# Patient Record
Sex: FEMALE | Race: WHITE | NOT HISPANIC OR LATINO | Employment: OTHER | URBAN - METROPOLITAN AREA
[De-identification: names, ages, dates, MRNs, and addresses within clinical notes are randomized per-mention and may not be internally consistent; named-entity substitution may affect disease eponyms.]

---

## 2019-07-29 ENCOUNTER — HOSPITAL ENCOUNTER (OUTPATIENT)
Dept: TELEMEDICINE | Facility: HOSPITAL | Age: 49
Discharge: HOME | End: 2019-07-29
Payer: MEDICAID

## 2019-07-29 DIAGNOSIS — I63.81 LACUNAR STROKE: ICD-10-CM

## 2019-07-29 PROCEDURE — 99203 PR OFFICE/OUTPT VISIT, NEW, LEVL III, 30-44 MIN: ICD-10-PCS | Mod: 95,,, | Performed by: PSYCHIATRY & NEUROLOGY

## 2019-07-29 PROCEDURE — 99203 OFFICE O/P NEW LOW 30 MIN: CPT | Mod: 95,,, | Performed by: PSYCHIATRY & NEUROLOGY

## 2019-07-29 NOTE — SUBJECTIVE & OBJECTIVE
Woke up with symptoms?: no    Recent bleeding noted: nosebleed 2 weeks ago  Does the patient take any Blood Thinners? no  Medications: Antiplatelets:  aspirin and clopidogrel/Plavix      Past Medical History: hypertension, diabetes, hyperlipidemia and stroke    Past Surgical History: no major surgeries within the last 2 weeks    Family History: no relevant history    Social History: no smoking, no drinking, no drugs    Allergies: Allergies have not been reviewed No relevant allergies    Review of Systems   Constitutional: Negative for fever.   HENT: Positive for nosebleeds.    Respiratory: Negative for shortness of breath.    Genitourinary: Negative for dysuria.   Musculoskeletal: Positive for back pain.   Neurological: Positive for weakness and numbness.     Objective:   Vitals: There were no vitals taken for this visit. BP: 130/75, HR 61    CT READ: Yes  No hemmorhage. No mass effect. No early infarct signs.     Physical Exam   Constitutional:   Obese, no distress   HENT:   Head: Normocephalic and atraumatic.   Eyes: Pupils are equal, round, and reactive to light. EOM are normal.   Pulmonary/Chest: Effort normal.   Neurological:   MS: A&XO3, speech fluent, follows commands, no neglect  CN: PERRL, EOMI, sensation intact, L facial, mild dysarthria  Motor: L arm/leg drift  Sens: reduced sensation and paresthesias L hemibody  Coord: no ataxia on finger to nose

## 2019-07-29 NOTE — HPI
48 y/o woman with PMH HTN, T2DM, stroke ~5 mos ago (received tPA, mild residual dysarthria), who presents for L sided weakness and numbness.  She states she was taking a shower around 2:30 when she twisted her back.  She felt lower back pain and then numbness and weakness on the L side of her body.  On arrival to ED, /75.

## 2020-03-10 ENCOUNTER — HOSPITAL ENCOUNTER (OUTPATIENT)
Dept: TELEMEDICINE | Facility: HOSPITAL | Age: 50
Discharge: HOME | End: 2020-03-10

## 2022-07-04 ENCOUNTER — HOSPITAL ENCOUNTER (OUTPATIENT)
Dept: TELEMEDICINE | Facility: HOSPITAL | Age: 52
Discharge: HOME | End: 2022-07-04
Payer: MEDICAID

## 2022-07-04 PROCEDURE — 99215 PR OFFICE/OUTPT VISIT, EST, LEVL V, 40-54 MIN: ICD-10-PCS | Mod: 95,,, | Performed by: PSYCHIATRY & NEUROLOGY

## 2022-07-04 PROCEDURE — 99215 OFFICE O/P EST HI 40 MIN: CPT | Mod: 95,,, | Performed by: PSYCHIATRY & NEUROLOGY

## 2022-07-04 NOTE — CONSULTS
[facility]  Vascular Neurology  Comprehensive Stroke Center  TeleVascular Neurology Acute Consultation Note      Consults    Consulting Provider: ANT BLUM  Current Providers  No providers found    Patient Location: [facility] - [facility] Emergency Department  Spoke hospital nurse at bedside with patient assisting consultant.     Patient information was obtained from patient.         Assessment/Plan:   Telestroke from [facility]. LKN: 5:00PM, Symptoms: Patient has a hx of a prior CVA with residual right side deficits. Currently having left sided weakness, left facial droop and slurred speech. She is candidate of tPA as her symptoms consistent with acute ischemic stroke.     IV- TPA: per the NINDS Study, IV TPA given within 3 hour window has a 33% chance of disability  improvement, 2% chance of patient getting worse, and 1% chance of severe complication/death.  Informed consent was obtained discussing this trial and IV TPA therapy. All questions by patient and/or  family were answered and the decision to proceed with IV TPA was made.    Recommend getting CTA head and neck, if large vessel occlusion, will get EVT, If no LVO, below is plan.    - Admit to MICU for 24 hour monitoring s/p IV tPA given at 6:47 PM    - Please use IV TPA order set  - Keep Strict NPO until patient passes bedside SS  - BP: Aggressive Treatment to keep blood pressure less than 180/105 mmHg, using: labetalol or  nicardipine, avoid hydralazine/nitrates  - Emergent CT Brain for: acute neurologic decline, nausea, vomiting, or new headache  - Repeat Imaging, ideally MRI Brain in 24 hours post treatment, but if unable to obtain MRI Brain, please get repeat non-con CT Brain  - TTE with shunt  - LDL, HgA1C  - Neuro checks, vital checks  - PT/OT/SS      Diagnoses:   Stroke like symptoms    STROKE DOCUMENTATION     Acute Stroke Times:   Acute Stroke Times   Last  Known Normal Date: 07/04/22  Last Known Normal Time: 1700  Stroke Team Called Date: 07/04/22  Stroke Team Called Time: 1744  Stroke Team Arrival Date: 07/04/22  Stroke Team Arrival Time: 1754  CT completed but images not available for review - spoke to document results: 1756    NIH Scale:  1a. Level of Consciousness: 0-->Alert, keenly responsive  1b. LOC Questions: 1-->Answers one question correctly  1c. LOC Commands: 0-->Performs both tasks correctly  2. Best Gaze: 0-->Normal  3. Visual: 0-->No visual loss  4. Facial Palsy: 2-->Partial paralysis (total or near-total paralysis of lower face)  5a. Motor Arm, Left: 2-->Some effort against gravity, limb cannot get to or maintain (if cued) 90 (or 45) degrees, drifts down to bed, but has some effort against gravity  5b. Motor Arm, Right: 0-->No drift, limb holds 90 (or 45) degrees for full 10 secs  6a. Motor Leg, Left: 3-->No effort against gravity, leg falls to bed immediately  6b. Motor Leg, Right: 0-->No drift, leg holds 30 degree position for full 5 secs  7. Limb Ataxia: 0-->Absent  8. Sensory: 1-->Mild-to-moderate sensory loss, patient feels pinprick is less sharp or is dull on the affected side, or there is a loss of superficial pain with pinprick, but patient is aware of being touched  9. Best Language: 0-->No aphasia, normal  10. Dysarthria: 1-->Mild-to-moderate dysarthria, patient slurs at least some words and, at worst, can be understood with some difficulty  11. Extinction and Inattention (formerly Neglect): 0-->No abnormality  Total (NIH Stroke Scale): 10     Modified [place]    [place] Coma Scale:    ABCD2 Score:    [id number] Score:   HAS -BLED Score:   ICH Score:   Hunt & Almaraz Classification:       There were no vitals taken for this visit.  Alteplase Eligible?: Yes  Alteplase Recommendation:   Alteplase Total Dose:   Total dose: Alteplase 0.9mg/kg (max dose:90mg)                      ** based on acquired weight from facility   Bolus Dose:   10% of total  Alteplase dose given intravenously over 1 minute   Continuous Infusion Dose:   Remaining 90% of total Alteplase dose infused intravenously over 60 minutes    **infusion must start at the same time as the bolus dose     Additional Recommendations:   1. Neurological assessment and vital signs (except temperature) every 15 minutes during Altaplase infusion.  2. Frequency of BP assessments may need to be increased if systolic BP stays >= 180 mm Hg or diastolic BP stays >= 105 mm Hg. Administer antihypertensive meds as ordered  3. Continue to monitor and control blood pressure and monitor for neurological deterioration every 15 minutes for the first hour after the infusion is stopped. Then every 30 minutes for the next 6 hours. Perform hourly monitoring from the 8th post-infusion hour until 24 hours post-infusion.  4. Temperature every 4 hours or as required.  5. Follow hospital protocol for further orders re: post tPA infusion patient management.  6. No antithrombotics or anticoagulants (including but not limited to: heparin, warfarin, aspirin, clopidigrel, or dipyridamole) for 24 hours, then start antithrombotics as ordered by treating physician    Adapted from the American Heart Association/American Stroke Association (AHA/ASA) and American Association of Neuroscience Nurses (AANN) Guidelines.   Possible Interventional Revascularization Candidate? Awaiting CTA results from Spoke for determination     Disposition Recommendation: pending further studies    Subjective:     History of Present Illness:  Telestroke from [facility]. LKN: 5:00PM, Symptoms: Patient has a hx of a prior CVA with residual right side deficits. Currently having left sided weakness, left facial droop and slurred speech.           Woke up with symptoms?: no    Recent bleeding noted: no  Does the patient take any Blood Thinners? yes  Medications: Antiplatelets:  clopidogrel/Plavix      Past Medical History: hypertension and stroke    Past  Surgical History: no major surgeries within the last 2 weeks    Family History: no relevant history    Social History: unable to obtain    Allergies: Allergies have not been reviewed No relevant allergies    Review of Systems  Objective:   Vitals: There were no vitals taken for this visit. BP: 137/75    CT READ: No    Physical Exam          Recommended the emergency room physician to have a brief discussion with the patient and/or family if available regarding the  risks and benefits of treatment, and to briefly document the occurrence of that discussion in his clinical encounter note.     The attending portion of this evaluation, treatment, and documentation was performed per Delbert Benedict MD via audiovisual.    Billing code:  (moderate to severe stroke, large areas of edema, some mimics)    · This patient has a critical neurological condition/illness, with high morbidity and mortality.  · There is a high probability for acute neurological change leading to clinical and possibly life-threatening deterioration requiring highest level of physician preparedness for urgent intervention.  · Care was coordinated with other physicians involved in the patient's care.  · Radiologic studies and laboratory data were reviewed and interpreted, and plan of care was re-assessed based on the results.  · Diagnosis, treatment options and prognosis may have been discussed with the patient and/or family members or caregiver.  · Further advanced medical management and further evaluation is warranted for his care.      In your opinion, this was a: Tier 1 Van Negative    Consult End Time: 6:16 PM     Delbert Benedict MD  Comprehensive Stroke Center  Vascular Neurology   [facility]

## 2022-07-04 NOTE — SUBJECTIVE & OBJECTIVE
Woke up with symptoms?: no    Recent bleeding noted: no  Does the patient take any Blood Thinners? yes  Medications: Antiplatelets:  clopidogrel/Plavix      Past Medical History: hypertension and stroke    Past Surgical History: no major surgeries within the last 2 weeks    Family History: no relevant history    Social History: unable to obtain    Allergies: Allergies have not been reviewed No relevant allergies    Review of Systems  Objective:   Vitals: There were no vitals taken for this visit. BP: 137/75    CT READ: No    Physical Exam

## 2022-07-04 NOTE — HPI
Telestroke from [facility]. LKN: 5:00PM, Symptoms: Patient has a hx of a prior CVA with residual right side deficits. Currently having left sided weakness, left facial droop and slurred speech.

## 2022-10-13 ENCOUNTER — HOSPITAL ENCOUNTER (OUTPATIENT)
Dept: TELEMEDICINE | Facility: HOSPITAL | Age: 52
Discharge: HOME | End: 2022-10-13
Payer: MEDICAID

## 2022-10-13 DIAGNOSIS — I63.9 ACUTE ISCHEMIC STROKE: ICD-10-CM

## 2022-10-13 PROCEDURE — 99215 PR OFFICE/OUTPT VISIT, EST, LEVL V, 40-54 MIN: ICD-10-PCS | Mod: 95,,, | Performed by: PSYCHIATRY & NEUROLOGY

## 2022-10-13 PROCEDURE — 99215 OFFICE O/P EST HI 40 MIN: CPT | Mod: 95,,, | Performed by: PSYCHIATRY & NEUROLOGY

## 2022-10-13 NOTE — ASSESSMENT & PLAN NOTE
Acute onset of disabling left sided weakness.  No contraindications to tpa.  DDx includes mimic/functional    Antithrombotics for secondary stroke prevention: Antiplatelets: None: Hold all Antithrombotics x 24 hours after IV t-PA administration    Statins for secondary stroke prevention and hyperlipidemia, if present:   Statins: per profile/home    Aggressive risk factor modification: HTN     Rehab efforts: The patient has been evaluated by a stroke team provider and the therapy needs have been fully considered based off the presenting complaints and exam findings. The following therapy evaluations are needed: PT evaluate and treat, OT evaluate and treat, SLP evaluate and treat    Diagnostics ordered/pending: CTA Head to assess vasculature , CTA Neck/Arch to assess vasculature, HgbA1C to assess blood glucose levels, Lipid Profile to assess cholesterol levels, MRI head without contrast to assess brain parenchyma, TTE to assess cardiac function/status     VTE prophylaxis: None: Reason for No Pharmacological VTE Prophylaxis: Holding x 24 hours s/p treatment with alteplase (tPA)    BP parameters: Infarct: Post tPA, SBP <180

## 2022-10-13 NOTE — SUBJECTIVE & OBJECTIVE
Woke up with symptoms?: no    Recent bleeding noted: no  Does the patient take any Blood Thinners? no  Medications: Antiplatelets:  clopidogrel/Plavix      Past Medical History: Stroke HTN. DM, bipolar      Past Surgical History: no major surgeries within the last 2 weeks    Family History: no relevant history    Social History: no smoking, no drinking, no drugs    Allergies: Allergies have not been reviewed No relevant allergies    Review of Systems   Constitutional: Negative for appetite change.   HENT: Negative for congestion.    Eyes: Negative for discharge.   Respiratory: Negative for shortness of breath.    Cardiovascular: Negative for chest pain.   Gastrointestinal: Negative for abdominal pain.   Endocrine: Negative for cold intolerance.   Genitourinary: Negative for difficulty urinating.   Musculoskeletal: Negative for joint swelling.   Skin: Negative for color change.     Objective:   Vitals:    HR 82, o2 92, RR 14, /85 mmHg  CT READ: Yes  No hemmorhage. No mass effect. No early infarct signs.     Physical Exam  Constitutional:       General: She is not in acute distress.  HENT:      Head: Normocephalic.      Right Ear: External ear normal.      Left Ear: External ear normal.   Eyes:      Conjunctiva/sclera: Conjunctivae normal.   Pulmonary:      Effort: Pulmonary effort is normal.      Breath sounds: No stridor.   Abdominal:      Tenderness: There is no abdominal tenderness.   Musculoskeletal:      Cervical back: Normal range of motion.   Skin:     General: Skin is dry.      Findings: No rash.

## 2022-10-13 NOTE — CONSULTS
[facility]  Vascular Neurology  Comprehensive Stroke Center  TeleVascular Neurology Acute Consultation Note      Consults    Consulting Provider: NELY MCKEON  Current Providers  No providers found    Patient Location: [facility] Emergency Department  Spoke hospital nurse at bedside with patient assisting consultant.     Patient information was obtained from patient.         Assessment/Plan:       Diagnoses:   Acute ischemic stroke  Acute onset of disabling left sided weakness.  No contraindications to tpa.  DDx includes mimic/functional    Antithrombotics for secondary stroke prevention: Antiplatelets: None: Hold all Antithrombotics x 24 hours after IV t-PA administration    Statins for secondary stroke prevention and hyperlipidemia, if present:   Statins: per profile/home    Aggressive risk factor modification: HTN     Rehab efforts: The patient has been evaluated by a stroke team provider and the therapy needs have been fully considered based off the presenting complaints and exam findings. The following therapy evaluations are needed: PT evaluate and treat, OT evaluate and treat, SLP evaluate and treat    Diagnostics ordered/pending: CTA Head to assess vasculature , CTA Neck/Arch to assess vasculature, HgbA1C to assess blood glucose levels, Lipid Profile to assess cholesterol levels, MRI head without contrast to assess brain parenchyma, TTE to assess cardiac function/status     VTE prophylaxis: None: Reason for No Pharmacological VTE Prophylaxis: Holding x 24 hours s/p treatment with alteplase (tPA)    BP parameters: Infarct: Post tPA, SBP <180          STROKE DOCUMENTATION     Acute Stroke Times:   Acute Stroke Times   Last Known Normal Time: 1600  Stroke Team Called Time: 1628  Stroke Team Arrival Time: 1641  CT Interpretation Time: 1641  Alteplase Recommended: YES. 1648  Thrombectomy Recommended: No    NIH Scale:  1a. Level  of Consciousness: 0-->Alert, keenly responsive  1b. LOC Questions: 0-->Answers both questions correctly  1c. LOC Commands: 0-->Performs both tasks correctly  2. Best Gaze: 0-->Normal  3. Visual: 0-->No visual loss  4. Facial Palsy: 2-->Partial paralysis (total or near-total paralysis of lower face)  5a. Motor Arm, Left: 2-->Some effort against gravity, limb cannot get to or maintain (if cued) 90 (or 45) degrees, drifts down to bed, but has some effort against gravity  5b. Motor Arm, Right: 0-->No drift, limb holds 90 (or 45) degrees for full 10 secs  6a. Motor Leg, Left: 2-->Some effort against gravity, leg falls to bed by 5 secs, but has some effort against gravity  6b. Motor Leg, Right: 0-->No drift, leg holds 30 degree position for full 5 secs  7. Limb Ataxia: 0-->Absent  8. Sensory: 1-->Mild-to-moderate sensory loss, patient feels pinprick is less sharp or is dull on the affected side, or there is a loss of superficial pain with pinprick, but patient is aware of being touched  9. Best Language: 0-->No aphasia, normal  10. Dysarthria: 1-->Mild-to-moderate dysarthria, patient slurs at least some words and, at worst, can be understood with some difficulty  11. Extinction and Inattention (formerly Neglect): 0-->No abnormality  Total (NIH Stroke Scale): 8     Modified [place]    [place] Coma Scale:    ABCD2 Score:    [id number] Score:   HAS -BLED Score:   ICH Score:   Hunt & Almaraz Classification:       There were no vitals taken for this visit.  Alteplase Eligible?: Yes  Alteplase Recommendation:   Alteplase Total Dose:   Total dose: Alteplase 0.9mg/kg (max dose:90mg)                      ** based on acquired weight from facility   Bolus Dose:   10% of total Alteplase dose given intravenously over 1 minute   Continuous Infusion Dose:   Remaining 90% of total Alteplase dose infused intravenously over 60 minutes    **infusion must start at the same time as the bolus dose     Additional Recommendations:   Neurological  assessment and vital signs (except temperature) every 15 minutes during Altaplase infusion.  Frequency of BP assessments may need to be increased if systolic BP stays >= 180 mm Hg or diastolic BP stays >= 105 mm Hg. Administer antihypertensive meds as ordered  Continue to monitor and control blood pressure and monitor for neurological deterioration every 15 minutes for the first hour after the infusion is stopped. Then every 30 minutes for the next 6 hours. Perform hourly monitoring from the 8th post-infusion hour until 24 hours post-infusion.  Temperature every 4 hours or as required.  Follow hospital protocol for further orders re: post tPA infusion patient management.  No antithrombotics or anticoagulants (including but not limited to: heparin, warfarin, aspirin, clopidigrel, or dipyridamole) for 24 hours, then start antithrombotics as ordered by treating physician    Adapted from the American Heart Association/American Stroke Association (AHA/ASA) and American Association of Neuroscience Nurses (AANN) Guidelines.   Possible Interventional Revascularization Candidate? No; at this time symptoms not suggestive of large vessel occlusion    Disposition Recommendation: admit to inpatient    Subjective:     History of Present Illness:  52F w/ LSN 1600, has slurred speech and numbness on left side      Woke up with symptoms?: no    Recent bleeding noted: no  Does the patient take any Blood Thinners? no  Medications: Antiplatelets:  clopidogrel/Plavix      Past Medical History: Stroke HTN. DM, bipolar      Past Surgical History: no major surgeries within the last 2 weeks    Family History: no relevant history    Social History: no smoking, no drinking, no drugs    Allergies: Allergies have not been reviewed No relevant allergies    Review of Systems   Constitutional: Negative for appetite change.   HENT: Negative for congestion.    Eyes: Negative for discharge.   Respiratory: Negative for shortness of breath.     Cardiovascular: Negative for chest pain.   Gastrointestinal: Negative for abdominal pain.   Endocrine: Negative for cold intolerance.   Genitourinary: Negative for difficulty urinating.   Musculoskeletal: Negative for joint swelling.   Skin: Negative for color change.     Objective:   Vitals:    HR 82, o2 92, RR 14, /85 mmHg  CT READ: Yes  No hemmorhage. No mass effect. No early infarct signs.     Physical Exam  Constitutional:       General: She is not in acute distress.  HENT:      Head: Normocephalic.      Right Ear: External ear normal.      Left Ear: External ear normal.   Eyes:      Conjunctiva/sclera: Conjunctivae normal.   Pulmonary:      Effort: Pulmonary effort is normal.      Breath sounds: No stridor.   Abdominal:      Tenderness: There is no abdominal tenderness.   Musculoskeletal:      Cervical back: Normal range of motion.   Skin:     General: Skin is dry.      Findings: No rash.           Recommended the emergency room physician to have a brief discussion with the patient and/or family if available regarding the  risks and benefits of treatment, and to briefly document the occurrence of that discussion in his clinical encounter note.     The attending portion of this evaluation, treatment, and documentation was performed per Jesse Ibrahim MD via audiovisual.    Billing code:  (time dependent stroke, complex case, unstable patient, hemorrhages, any intervention, some mimics)    This patient has a very critical neurological condition/illness, with very high morbidity and mortality.  There is a very high probability for acute neurological change leading to clinical and possibly life-threatening deterioration requiring highest level of physician preparedness for urgent intervention.  There is possibility that this condition will require treatment with high risk medications as quickly as possible.  There is also a possibility that the patient may benefit from further, more advance complex  therapies (e.g. endovascular therapy) that will require prompt diagnosis and care.  Care was coordinated with other physicians involved in the patient's care.  Radiologic studies and laboratory data were reviewed and interpreted, and plan of care was re-assessed based on the results.  Diagnosis, treatment options and prognosis may have been discussed with the patient and/or family members or caregiver.  Further advanced medical management and further evaluation is warranted for his care.    In your opinion, this was a: Tier 1 Van Negative    Consult End Time: 4:49 PM     Jesse Ibrahim MD  [facility] Stroke [facility]  Vascular Neurology   [facility]

## 2022-11-15 ENCOUNTER — HOSPITAL ENCOUNTER (INPATIENT)
Facility: HOSPITAL | Age: 52
LOS: 3 days | Discharge: HOME HEALTH | DRG: 065 | End: 2022-11-18
Attending: EMERGENCY MEDICINE | Admitting: INTERNAL MEDICINE
Payer: MEDICAID

## 2022-11-15 DIAGNOSIS — I63.9 STROKE: ICD-10-CM

## 2022-11-15 DIAGNOSIS — R53.1 LEFT-SIDED WEAKNESS: ICD-10-CM

## 2022-11-15 DIAGNOSIS — R29.90 STROKE-LIKE SYMPTOMS: Primary | ICD-10-CM

## 2022-11-15 DIAGNOSIS — I63.9 ACUTE ISCHEMIC STROKE: ICD-10-CM

## 2022-11-15 DIAGNOSIS — R07.9 CHEST PAIN: ICD-10-CM

## 2022-11-15 DIAGNOSIS — M79.602 LEFT ARM PAIN: ICD-10-CM

## 2022-11-15 DIAGNOSIS — Z72.0 TOBACCO USER: ICD-10-CM

## 2022-11-15 DIAGNOSIS — I63.81 LACUNAR STROKE: ICD-10-CM

## 2022-11-15 LAB
ALBUMIN SERPL-MCNC: 3.8 GM/DL (ref 3.5–5)
ALBUMIN/GLOB SERPL: 1 RATIO (ref 1.1–2)
ALP SERPL-CCNC: 127 UNIT/L (ref 40–150)
ALT SERPL-CCNC: 45 UNIT/L (ref 0–55)
AST SERPL-CCNC: 46 UNIT/L (ref 5–34)
BASOPHILS # BLD AUTO: 0.09 X10(3)/MCL (ref 0–0.2)
BASOPHILS NFR BLD AUTO: 0.8 %
BILIRUBIN DIRECT+TOT PNL SERPL-MCNC: 0.4 MG/DL
BNP BLD-MCNC: <10 PG/ML
BUN SERPL-MCNC: 5.5 MG/DL (ref 9.8–20.1)
CALCIUM SERPL-MCNC: 9.4 MG/DL (ref 8.4–10.2)
CHLORIDE SERPL-SCNC: 98 MMOL/L (ref 98–107)
CHOLEST SERPL-MCNC: 131 MG/DL
CHOLEST/HDLC SERPL: 4 {RATIO} (ref 0–5)
CO2 SERPL-SCNC: 29 MMOL/L (ref 22–29)
CREAT SERPL-MCNC: 0.75 MG/DL (ref 0.55–1.02)
CRP SERPL HS-MCNC: 14.23 MG/L
EOSINOPHIL # BLD AUTO: 1.21 X10(3)/MCL (ref 0–0.9)
EOSINOPHIL NFR BLD AUTO: 10.5 %
ERYTHROCYTE [DISTWIDTH] IN BLOOD BY AUTOMATED COUNT: 15 % (ref 11.5–17)
ERYTHROCYTE [SEDIMENTATION RATE] IN BLOOD: 32 MM/HR (ref 0–20)
EST. AVERAGE GLUCOSE BLD GHB EST-MCNC: 271.9 MG/DL
GFR SERPLBLD CREATININE-BSD FMLA CKD-EPI: >60 MLS/MIN/1.73/M2
GLOBULIN SER-MCNC: 3.7 GM/DL (ref 2.4–3.5)
GLUCOSE SERPL-MCNC: 135 MG/DL (ref 74–100)
HBA1C MFR BLD: 11.1 %
HCT VFR BLD AUTO: 45.3 % (ref 37–47)
HDLC SERPL-MCNC: 35 MG/DL (ref 35–60)
HGB BLD-MCNC: 14.4 GM/DL (ref 12–16)
IMM GRANULOCYTES # BLD AUTO: 0.06 X10(3)/MCL (ref 0–0.04)
IMM GRANULOCYTES NFR BLD AUTO: 0.5 %
LDLC SERPL CALC-MCNC: 70 MG/DL (ref 50–140)
LYMPHOCYTES # BLD AUTO: 2.99 X10(3)/MCL (ref 0.6–4.6)
LYMPHOCYTES NFR BLD AUTO: 25.9 %
MCH RBC QN AUTO: 29.1 PG (ref 27–31)
MCHC RBC AUTO-ENTMCNC: 31.8 MG/DL (ref 33–36)
MCV RBC AUTO: 91.5 FL (ref 80–94)
MONOCYTES # BLD AUTO: 0.63 X10(3)/MCL (ref 0.1–1.3)
MONOCYTES NFR BLD AUTO: 5.5 %
NEUTROPHILS # BLD AUTO: 6.6 X10(3)/MCL (ref 2.1–9.2)
NEUTROPHILS NFR BLD AUTO: 56.8 %
NRBC BLD AUTO-RTO: 0 %
PLATELET # BLD AUTO: 339 X10(3)/MCL (ref 130–400)
PMV BLD AUTO: 9.8 FL (ref 7.4–10.4)
POTASSIUM SERPL-SCNC: 4.1 MMOL/L (ref 3.5–5.1)
PROT SERPL-MCNC: 7.5 GM/DL (ref 6.4–8.3)
RBC # BLD AUTO: 4.95 X10(6)/MCL (ref 4.2–5.4)
SODIUM SERPL-SCNC: 136 MMOL/L (ref 136–145)
TRIGL SERPL-MCNC: 129 MG/DL (ref 37–140)
TROPONIN I SERPL-MCNC: <0.01 NG/ML (ref 0–0.04)
TROPONIN I SERPL-MCNC: <0.01 NG/ML (ref 0–0.04)
TSH SERPL-ACNC: 6.42 UIU/ML (ref 0.35–4.94)
VLDLC SERPL CALC-MCNC: 26 MG/DL
WBC # SPEC AUTO: 11.6 X10(3)/MCL (ref 4.5–11.5)

## 2022-11-15 PROCEDURE — 25500020 PHARM REV CODE 255: Performed by: INTERNAL MEDICINE

## 2022-11-15 PROCEDURE — 84484 ASSAY OF TROPONIN QUANT: CPT | Performed by: NURSE PRACTITIONER

## 2022-11-15 PROCEDURE — 96375 TX/PRO/DX INJ NEW DRUG ADDON: CPT

## 2022-11-15 PROCEDURE — 84484 ASSAY OF TROPONIN QUANT: CPT | Performed by: EMERGENCY MEDICINE

## 2022-11-15 PROCEDURE — 93010 ELECTROCARDIOGRAM REPORT: CPT | Mod: ,,, | Performed by: INTERNAL MEDICINE

## 2022-11-15 PROCEDURE — 99285 EMERGENCY DEPT VISIT HI MDM: CPT | Mod: 25

## 2022-11-15 PROCEDURE — 11000001 HC ACUTE MED/SURG PRIVATE ROOM

## 2022-11-15 PROCEDURE — 93005 ELECTROCARDIOGRAM TRACING: CPT

## 2022-11-15 PROCEDURE — 83036 HEMOGLOBIN GLYCOSYLATED A1C: CPT | Performed by: NURSE PRACTITIONER

## 2022-11-15 PROCEDURE — 84443 ASSAY THYROID STIM HORMONE: CPT | Performed by: NURSE PRACTITIONER

## 2022-11-15 PROCEDURE — 86141 C-REACTIVE PROTEIN HS: CPT | Performed by: NURSE PRACTITIONER

## 2022-11-15 PROCEDURE — 85025 COMPLETE CBC W/AUTO DIFF WBC: CPT | Performed by: NURSE PRACTITIONER

## 2022-11-15 PROCEDURE — 93010 EKG 12-LEAD: ICD-10-PCS | Mod: ,,, | Performed by: INTERNAL MEDICINE

## 2022-11-15 PROCEDURE — 82553 CREATINE MB FRACTION: CPT | Performed by: NURSE PRACTITIONER

## 2022-11-15 PROCEDURE — 85651 RBC SED RATE NONAUTOMATED: CPT | Performed by: NURSE PRACTITIONER

## 2022-11-15 PROCEDURE — 80061 LIPID PANEL: CPT | Performed by: NURSE PRACTITIONER

## 2022-11-15 PROCEDURE — 80053 COMPREHEN METABOLIC PANEL: CPT | Performed by: NURSE PRACTITIONER

## 2022-11-15 PROCEDURE — 96374 THER/PROPH/DIAG INJ IV PUSH: CPT

## 2022-11-15 PROCEDURE — 25000003 PHARM REV CODE 250: Performed by: NURSE PRACTITIONER

## 2022-11-15 PROCEDURE — 83735 ASSAY OF MAGNESIUM: CPT | Performed by: NURSE PRACTITIONER

## 2022-11-15 PROCEDURE — 63600175 PHARM REV CODE 636 W HCPCS: Performed by: EMERGENCY MEDICINE

## 2022-11-15 PROCEDURE — 83880 ASSAY OF NATRIURETIC PEPTIDE: CPT | Performed by: NURSE PRACTITIONER

## 2022-11-15 RX ORDER — ASPIRIN 81 MG/1
81 TABLET ORAL DAILY
Status: DISCONTINUED | OUTPATIENT
Start: 2022-11-16 | End: 2022-11-18 | Stop reason: HOSPADM

## 2022-11-15 RX ORDER — SODIUM CHLORIDE 9 MG/ML
INJECTION, SOLUTION INTRAVENOUS CONTINUOUS
Status: DISCONTINUED | OUTPATIENT
Start: 2022-11-15 | End: 2022-11-18 | Stop reason: HOSPADM

## 2022-11-15 RX ORDER — MORPHINE SULFATE 4 MG/ML
4 INJECTION, SOLUTION INTRAMUSCULAR; INTRAVENOUS
Status: COMPLETED | OUTPATIENT
Start: 2022-11-15 | End: 2022-11-15

## 2022-11-15 RX ORDER — ONDANSETRON 2 MG/ML
4 INJECTION INTRAMUSCULAR; INTRAVENOUS
Status: COMPLETED | OUTPATIENT
Start: 2022-11-15 | End: 2022-11-15

## 2022-11-15 RX ORDER — LABETALOL HYDROCHLORIDE 5 MG/ML
10 INJECTION, SOLUTION INTRAVENOUS EVERY 4 HOURS PRN
Status: DISCONTINUED | OUTPATIENT
Start: 2022-11-15 | End: 2022-11-18 | Stop reason: HOSPADM

## 2022-11-15 RX ADMIN — MORPHINE SULFATE 4 MG: 4 INJECTION INTRAVENOUS at 09:11

## 2022-11-15 RX ADMIN — IOPAMIDOL 100 ML: 755 INJECTION, SOLUTION INTRAVENOUS at 10:11

## 2022-11-15 RX ADMIN — SODIUM CHLORIDE: 9 INJECTION, SOLUTION INTRAVENOUS at 11:11

## 2022-11-15 RX ADMIN — ONDANSETRON 4 MG: 2 INJECTION INTRAMUSCULAR; INTRAVENOUS at 09:11

## 2022-11-15 NOTE — FIRST PROVIDER EVALUATION
"Medical screening examination initiated.  I have conducted a focused provider triage encounter, findings are as follows:    Brief history of present illness:  Patient states chest pain and left arm pain. Hx. Of a MI.     Vitals:    11/15/22 1714   BP: 116/82   Pulse: 87   Resp: 18   Temp: 98.3 °F (36.8 °C)   TempSrc: Oral   SpO2: 97%   Weight: 116.6 kg (257 lb)   Height: 5' 5" (1.651 m)       Pertinent physical exam:  Awake, alert    Brief workup plan:  labs, EKG    Preliminary workup initiated; this workup will be continued and followed by the physician or advanced practice provider that is assigned to the patient when roomed.  "

## 2022-11-16 LAB
APPEARANCE UR: CLEAR
APTT PPP: 30.1 SECONDS (ref 23.2–33.7)
BACTERIA #/AREA URNS AUTO: ABNORMAL /HPF
BASOPHILS # BLD AUTO: 0.05 X10(3)/MCL (ref 0–0.2)
BASOPHILS NFR BLD AUTO: 0.5 %
BILIRUB UR QL STRIP.AUTO: NEGATIVE MG/DL
CK MB SERPL-MCNC: 0.8 NG/ML
COLOR UR AUTO: YELLOW
EOSINOPHIL # BLD AUTO: 0.88 X10(3)/MCL (ref 0–0.9)
EOSINOPHIL NFR BLD AUTO: 9.6 %
ERYTHROCYTE [DISTWIDTH] IN BLOOD BY AUTOMATED COUNT: 15.1 % (ref 11.5–17)
GLUCOSE UR QL STRIP.AUTO: NEGATIVE MG/DL
HCT VFR BLD AUTO: 43.5 % (ref 37–47)
HGB BLD-MCNC: 13.6 GM/DL (ref 12–16)
IMM GRANULOCYTES # BLD AUTO: 0.03 X10(3)/MCL (ref 0–0.04)
IMM GRANULOCYTES NFR BLD AUTO: 0.3 %
INR BLD: 0.98 (ref 0–1.3)
KETONES UR QL STRIP.AUTO: NEGATIVE MG/DL
LEUKOCYTE ESTERASE UR QL STRIP.AUTO: ABNORMAL UNIT/L
LYMPHOCYTES # BLD AUTO: 2.6 X10(3)/MCL (ref 0.6–4.6)
LYMPHOCYTES NFR BLD AUTO: 28.3 %
MAGNESIUM SERPL-MCNC: 1.9 MG/DL (ref 1.6–2.6)
MCH RBC QN AUTO: 28.8 PG (ref 27–31)
MCHC RBC AUTO-ENTMCNC: 31.3 MG/DL (ref 33–36)
MCV RBC AUTO: 92 FL (ref 80–94)
MONOCYTES # BLD AUTO: 0.58 X10(3)/MCL (ref 0.1–1.3)
MONOCYTES NFR BLD AUTO: 6.3 %
NEUTROPHILS # BLD AUTO: 5.1 X10(3)/MCL (ref 2.1–9.2)
NEUTROPHILS NFR BLD AUTO: 55 %
NITRITE UR QL STRIP.AUTO: NEGATIVE
NRBC BLD AUTO-RTO: 0 %
PH UR STRIP.AUTO: 5.5 [PH]
PLATELET # BLD AUTO: 309 X10(3)/MCL (ref 130–400)
PMV BLD AUTO: 9.8 FL (ref 7.4–10.4)
POCT GLUCOSE: 100 MG/DL (ref 70–110)
PROT UR QL STRIP.AUTO: NEGATIVE MG/DL
PROTHROMBIN TIME: 12.9 SECONDS (ref 12.5–14.5)
RBC # BLD AUTO: 4.73 X10(6)/MCL (ref 4.2–5.4)
RBC #/AREA URNS AUTO: <5 /HPF
RBC UR QL AUTO: NEGATIVE UNIT/L
SP GR UR STRIP.AUTO: >=1.04 (ref 1–1.03)
SQUAMOUS #/AREA URNS AUTO: <5 /HPF
TROPONIN I SERPL-MCNC: <0.01 NG/ML (ref 0–0.04)
UROBILINOGEN UR STRIP-ACNC: 0.2 MG/DL
WBC # SPEC AUTO: 9.2 X10(3)/MCL (ref 4.5–11.5)
WBC #/AREA URNS AUTO: 75 /HPF

## 2022-11-16 PROCEDURE — 81001 URINALYSIS AUTO W/SCOPE: CPT | Performed by: NURSE PRACTITIONER

## 2022-11-16 PROCEDURE — 87088 URINE BACTERIA CULTURE: CPT | Performed by: NURSE PRACTITIONER

## 2022-11-16 PROCEDURE — 85025 COMPLETE CBC W/AUTO DIFF WBC: CPT | Performed by: NURSE PRACTITIONER

## 2022-11-16 PROCEDURE — 11000001 HC ACUTE MED/SURG PRIVATE ROOM

## 2022-11-16 PROCEDURE — 21400001 HC TELEMETRY ROOM

## 2022-11-16 PROCEDURE — 85610 PROTHROMBIN TIME: CPT | Performed by: NURSE PRACTITIONER

## 2022-11-16 PROCEDURE — 25000003 PHARM REV CODE 250: Performed by: NURSE PRACTITIONER

## 2022-11-16 PROCEDURE — 99223 PR INITIAL HOSPITAL CARE,LEVL III: ICD-10-PCS | Mod: ,,, | Performed by: PSYCHIATRY & NEUROLOGY

## 2022-11-16 PROCEDURE — 84484 ASSAY OF TROPONIN QUANT: CPT | Performed by: NURSE PRACTITIONER

## 2022-11-16 PROCEDURE — 85730 THROMBOPLASTIN TIME PARTIAL: CPT | Performed by: NURSE PRACTITIONER

## 2022-11-16 PROCEDURE — 99223 1ST HOSP IP/OBS HIGH 75: CPT | Mod: ,,, | Performed by: PSYCHIATRY & NEUROLOGY

## 2022-11-16 PROCEDURE — 63600175 PHARM REV CODE 636 W HCPCS: Performed by: NURSE PRACTITIONER

## 2022-11-16 RX ORDER — FUROSEMIDE 20 MG/1
20 TABLET ORAL DAILY
COMMUNITY
Start: 2022-07-04

## 2022-11-16 RX ORDER — QUETIAPINE FUMARATE 100 MG/1
100 TABLET, FILM COATED ORAL DAILY
COMMUNITY
Start: 2022-10-13

## 2022-11-16 RX ORDER — CYCLOBENZAPRINE HCL 5 MG
5 TABLET ORAL 2 TIMES DAILY
Status: ON HOLD | COMMUNITY
Start: 2022-11-10 | End: 2022-11-16

## 2022-11-16 RX ORDER — ATORVASTATIN CALCIUM 80 MG/1
80 TABLET, FILM COATED ORAL DAILY
COMMUNITY
Start: 2022-07-04

## 2022-11-16 RX ORDER — ALPRAZOLAM 1 MG/1
1 TABLET ORAL
COMMUNITY
Start: 2022-07-04

## 2022-11-16 RX ORDER — HYDROCODONE BITARTRATE AND ACETAMINOPHEN 5; 325 MG/1; MG/1
1 TABLET ORAL EVERY 6 HOURS PRN
Status: DISCONTINUED | OUTPATIENT
Start: 2022-11-16 | End: 2022-11-18 | Stop reason: HOSPADM

## 2022-11-16 RX ORDER — ERGOCALCIFEROL 1.25 MG/1
50000 CAPSULE ORAL WEEKLY
COMMUNITY
Start: 2022-10-31

## 2022-11-16 RX ORDER — FAMOTIDINE 40 MG/1
40 TABLET, FILM COATED ORAL NIGHTLY
COMMUNITY
Start: 2022-10-13

## 2022-11-16 RX ORDER — INSULIN ASPART 100 [IU]/ML
1-10 INJECTION, SOLUTION INTRAVENOUS; SUBCUTANEOUS EVERY 6 HOURS PRN
Status: DISCONTINUED | OUTPATIENT
Start: 2022-11-16 | End: 2022-11-18 | Stop reason: HOSPADM

## 2022-11-16 RX ORDER — ALPRAZOLAM 0.25 MG/1
0.25 TABLET ORAL ONCE
Status: COMPLETED | OUTPATIENT
Start: 2022-11-16 | End: 2022-11-16

## 2022-11-16 RX ORDER — CLONIDINE HYDROCHLORIDE 0.1 MG/1
0.1 TABLET ORAL DAILY PRN
COMMUNITY
Start: 2022-07-04

## 2022-11-16 RX ORDER — OMEPRAZOLE 20 MG/1
20 CAPSULE, DELAYED RELEASE ORAL DAILY
COMMUNITY
Start: 2022-05-31

## 2022-11-16 RX ORDER — GLUCAGON 1 MG
1 KIT INJECTION
Status: DISCONTINUED | OUTPATIENT
Start: 2022-11-16 | End: 2022-11-18 | Stop reason: HOSPADM

## 2022-11-16 RX ORDER — QUETIAPINE FUMARATE 300 MG/1
600 TABLET, FILM COATED ORAL NIGHTLY
COMMUNITY
Start: 2022-05-31

## 2022-11-16 RX ORDER — DESVENLAFAXINE SUCCINATE 25 MG/1
50 TABLET, EXTENDED RELEASE ORAL DAILY
COMMUNITY
Start: 2022-07-04

## 2022-11-16 RX ORDER — LISINOPRIL 5 MG/1
5 TABLET ORAL DAILY
COMMUNITY
Start: 2022-07-04

## 2022-11-16 RX ORDER — ACETAMINOPHEN 650 MG/1
650 SUPPOSITORY RECTAL EVERY 6 HOURS PRN
Status: DISCONTINUED | OUTPATIENT
Start: 2022-11-16 | End: 2022-11-16

## 2022-11-16 RX ORDER — MORPHINE SULFATE 4 MG/ML
4 INJECTION, SOLUTION INTRAMUSCULAR; INTRAVENOUS EVERY 4 HOURS PRN
Status: DISCONTINUED | OUTPATIENT
Start: 2022-11-16 | End: 2022-11-18 | Stop reason: HOSPADM

## 2022-11-16 RX ORDER — ACETAMINOPHEN 325 MG/1
650 TABLET ORAL EVERY 6 HOURS PRN
Status: DISCONTINUED | OUTPATIENT
Start: 2022-11-16 | End: 2022-11-18 | Stop reason: HOSPADM

## 2022-11-16 RX ORDER — LEVOTHYROXINE SODIUM 200 UG/1
1 CAPSULE ORAL DAILY
COMMUNITY
Start: 2022-10-19

## 2022-11-16 RX ORDER — METFORMIN HYDROCHLORIDE 1000 MG/1
1000 TABLET ORAL 2 TIMES DAILY
COMMUNITY
Start: 2022-07-04

## 2022-11-16 RX ORDER — ROPINIROLE 1 MG/1
1 TABLET, FILM COATED ORAL NIGHTLY
COMMUNITY
Start: 2022-07-04

## 2022-11-16 RX ORDER — CLOPIDOGREL BISULFATE 75 MG/1
75 TABLET ORAL DAILY
COMMUNITY
Start: 2022-05-31

## 2022-11-16 RX ORDER — ROPINIROLE 1 MG/1
0.5 TABLET, FILM COATED ORAL DAILY
COMMUNITY
Start: 2022-05-31

## 2022-11-16 RX ADMIN — HYDROCODONE BITARTRATE AND ACETAMINOPHEN 1 TABLET: 5; 325 TABLET ORAL at 09:11

## 2022-11-16 RX ADMIN — HYDROCODONE BITARTRATE AND ACETAMINOPHEN 1 TABLET: 5; 325 TABLET ORAL at 03:11

## 2022-11-16 RX ADMIN — MORPHINE SULFATE 4 MG: 4 INJECTION INTRAVENOUS at 12:11

## 2022-11-16 RX ADMIN — ASPIRIN 81 MG: 81 TABLET, COATED ORAL at 09:11

## 2022-11-16 RX ADMIN — MORPHINE SULFATE 4 MG: 4 INJECTION INTRAVENOUS at 06:11

## 2022-11-16 RX ADMIN — ALPRAZOLAM 0.25 MG: 0.25 TABLET ORAL at 09:11

## 2022-11-16 NOTE — H&P
"[facility]  Hospital Medicine History & Physical Examination       Patient Name: Seema Ellis  MRN: [id number]  Patient Class: IP- Inpatient   Admission Date: 11/16/2022   Admitting Service: Hospital Medicine   Length of Stay: 1  Attending Physician: Dr. Walker  Primary Care Provider: AMBIKA Solorzano  Face-to-Face encounter date: 11/16/2022  Code Status: Full  Chief Complaint: Chest Pain (Complaining of left arm pain since yesterday; chest pain today; hx of MI, stroke; received 3mg morphine IV, 1" nitro paste, 4mg zofran IV, 324mg aspirin with ems)    Present at Bedside: Staff  Source of Information: Patient. Medical Records      HISTORY OF PRESENT ILLNESS:   Seema Ellis is a 52 y.o. female with a PMHx of HTN, Hypothyroidism, DM 2, Morbid Obesity - BMI 42.77, Bipolar II Disorder, Anxiety, RLS, HLD, Tobacco Abuse, CVA with residual left hemiplegia who presented to [facility] via EMS on 11/15/2022 with c/o left arm pain that began at 1345 on 11/14/22 with associated CP and left sided facial droop. Left arm pain was described as stabbing pins and needles. She was given Morphine 3 mg IV, Nitro Paste, Zofran 4mg IV, and ASA via EMS. CP had resolved.  Of note, patient was seen an outlying facility with stroke-like symptoms on 10/13/22 and was given tPA with resolution of symptoms.  CUS with left ICA systolic velocities suggest a 50-69% luminal stenosis.    ED vital signs stable.  Labs notable for WBC 11.6, sed rate 32, BUN 5.5, glucose 135, CRP 14.23, TSH 6.4243, hemoglobin A1c 11.1%.  Initial troponin and repeat undetectable.  CXR negative.  CT head negative for acute intracranial findings.  CTA head neck negative for acute intracranial process. She was admitted to hospital medicine services for further work-up and management of care.     REVIEW OF SYSTEMS:   Except as documented, all other systems reviewed and negative     PAST MEDICAL HISTORY:   HTN, Hypothyroidism, IDDM 2, " Morbid Obesity - BMI 42.77, Bipolar II Disorder, Anxiety, RLS, HLD, Tobacco Abuse, CVA with residual left hemiplegia, CAD s/p MI    PAST SURGICAL HISTORY:   Denied    FAMILY HISTORY:   Reviewed and negative    SOCIAL HISTORY:   Denied alcohol, illicit drug use. Smokes 1/2 PPD Cigarettes    ALLERGIES:   Penicillins, Toradol [ketorolac], and Tramadol    HOME MEDICATIONS:     Prior to Admission medications    Not on File     ________________________________________________________________________  INPATIENT LIST OF MEDICATIONS     Current Facility-Administered Medications:     0.9%  NaCl infusion, , Intravenous, Continuous, AMBIKA Stewart, Last Rate: 75 mL/hr at 11/15/22 2315, New Bag at 11/15/22 2315    acetaminophen tablet 650 mg, 650 mg, Oral, Q6H PRN, Raghu Simeon MD    aspirin EC tablet 81 mg, 81 mg, Oral, Daily, AMBIKA Stewart    labetaloL injection 10 mg, 10 mg, Intravenous, Q4H PRN, AMBIKA Stewart  No current outpatient medications on file.    Scheduled Meds:   aspirin  81 mg Oral Daily     Continuous Infusions:   sodium chloride 0.9% 75 mL/hr at 11/15/22 2315     PRN Meds:.acetaminophen, labetalol    PHYSICAL EXAM:     VITAL SIGNS: 24 HRS MIN & MAX LAST   Temp  Min: 98.3 °F (36.8 °C)  Max: 98.3 °F (36.8 °C) 98.3 °F (36.8 °C)   BP  Min: 109/93  Max: 134/84 (!) 117/90     Pulse  Min: 69  Max: 87  73   Resp  Min: 9  Max: 33 12   SpO2  Min: 91 %  Max: 97 % (!) 94 %         General appearance: Well-developed female in no apparent distress.  HENT: Atraumatic head. Moist mucous membranes of oral cavity.  Eyes: Normal extraocular movements.   Neck: Supple.   Lungs: Clear to auscultation bilaterally.   Heart: Regular rate and rhythm. S1 and S2 present. No pedal edema.  Abdomen: Soft, non-distended, non-tender. Obese  Extremities: LUE paresthesias  Skin: No Rash.   Neuro: Motor and sensory exams grossly intact. Dysarthria, left facial droop  Psych/mental status: Appropriate mood and affect. Responds  appropriately to questions.     LABS AND IMAGING:     Recent Labs   Lab 11/15/22  1756 11/16/22  0321   WBC 11.6* 9.2   RBC 4.95 4.73   HGB 14.4 13.6   HCT 45.3 43.5   MCV 91.5 92.0   MCH 29.1 28.8   MCHC 31.8* 31.3*   RDW 15.0 15.1    309   MPV 9.8 9.8       Recent Labs   Lab 11/15/22  1755 11/15/22  2124     --    K 4.1  --    CO2 29  --    BUN 5.5*  --    CREATININE 0.75  --    CALCIUM 9.4  --    MG  --  1.90   ALBUMIN 3.8  --    ALKPHOS 127  --    ALT 45  --    AST 46*  --    BILITOT 0.4  --        Microbiology Results (last 7 days)       ** No results found for the last 168 hours. **             CTA Head and Neck (xpd)  Narrative: Technique:CT angiogram of the intracranial vessels was performed with intravenous contrast with direct axial as well as sagittal and coronal reformations. CT angiogram of the neck vessels was performed with intravenous contrast with direct axial as well as sagittal and coronal reformations.  MIP and MPR images were also reconstructed.    Automated exposure control was utilized to minimize radiation dose.  [facility] 01/09/2019    Comparison:Comparison is with study dated 2022-11-15 21:33:47.    Clinical history:Left facial droop, weakness.    Findings:    Carotid arteries were assessed in accordance with the NASCET criteria.    Intracranial Vascular structures:    Internal carotid arteries:Mild atheromatous calcification of the cavernous clinoid segment of the bilateral internal carotid artery is seen with mild stenosis.    Middle cerebral arteries:Unremarkable.    Anterior cerebral arteries:Unremarkable.    Vertebral arteries:Left vertebral artery is dominant. The vertebrobasilar junction is unremarkable.    Basilar artery:Unremarkable.    Posterior cerebral arteries:Unremarkable.    Posterior communicating arteries:Unremarkable.    Neck Vascular structures:The visualized aorta and origin of the great vessels of the neck appear unremarkable. There is direct origin of the left  vertebral artery from the aortic arch.    Carotids:    Common carotid arteries:Unremarkable.    Internal carotid artery:Left internal carotid artery is unremarkable. Subtle atheromatous calcification without significant stenosis at the origin of the right internal carotid artery is seen.    Vertebral arteries:Left vertebral artery is dominant.    Jugular Veins and venous sinuses:Unremarkable.    Hemorrhage:No acute intracranial hemorrhage is seen.    CSF spaces:The ventricles sulci and basal cisterns are within normal limits.    Brain parenchyma:There is preservation of the grey white junction throughout.    Cerebellum:Unremarkable.    Sella and skull base:The sella appears to be within normal limits.    Intracranial calcifications:Incidental note is made of bilateral choroid plexus calcification. Incidental note is made of some pineal region calcification.  Impression: Impression:    1. No hemodynamically significant stenosis, aneurysm or vascular malformation is seen. No acute intracranial process is seen. Details and findings as noted above.    No significant discrepancy with overnight report.    Electronically signed by: Tomy Santiago  Date:    11/16/2022  Time:    06:25        ASSESSMENT & PLAN:     Suspected Acute CVA  - LUE Paresthesias, Dysarthria, Left Facial Droop  Hx of CVA with residual left sided hemiplegia  Hypothyroidism   Essential HTN  HLD  IDDM 2, uncontrolled, Hgba1c 11.1%  Tobacco Abuse  Morbid Obesity - BMI 42.77  Hx of Bipolar II Disorder, Anxiety, RLS    Plan:  Neurology consulted, follow up with recommendations  MRI brain, B Carotid US and ECHO pending  Hold antihypertensives to allow for permissive hypertension  PRN labetalol as needed for SBP greater than 220 or DBP greater than 100  PT/OT/SLP to evaluate and treat when appropriate  Neuro checks every 4 hours  Fall precautions  PRN Norco for Pain  Xanax PO x1 prior to MRI  Nicotine patch if pt desires  NS at 75 ml/hr  Resume other  appropriate home medications  Labs in AM       VTE Prophylaxis: SCDs    Discharge Planning and Disposition: TBD    ARABELLA, Haley Britton, NP have reviewed and discussed the case with Dr. Walker.   Please see the attending MD's addendum for further assessment and plan.    Haley Britton, AGACNP-BC  11/16/2022    _______________________________________________________________________________  MD Addendum:  IDr. , assumed care of this patient today at --am/pm  For the patient encounter, I performed the substantive portion of the visit, I reviewed the NP/PA documentation, treatment plan, and medical decision making.  I had face to face time with this patient     A. History:    B. Physical exam:    C. Medical decision making:      All diagnosis and differential diagnosis have been reviewed; assessment and plan has been documented; I have personally reviewed the labs and test results that are presently available; I have reviewed the patients medication list; I have reviewed the consulting providers response and recommendations. I have reviewed or attempted to review medical records based upon their availability.    All of the patient and family questions have been addressed and answered. Patient's is agreeable to the above stated plan. I will continue to monitor closely and make adjustments to medical management as needed.      11/16/2022

## 2022-11-16 NOTE — ED PROVIDER NOTES
"Encounter Date: 11/15/2022    SCRIBE #1 NOTE: I, Sanjiv Pascual, am scribing for, and in the presence of,  Anuj Perera MD. I have scribed the following portions of the note - Other sections scribed: HPI, ROS, PE.     History     Chief Complaint   Patient presents with    Chest Pain     Complaining of left arm pain since yesterday; chest pain today; hx of MI, stroke; received 3mg morphine IV, 1" nitro paste, 4mg zofran IV, 324mg aspirin with ems     52 year female with past medical history of stroke presents to ED c/o left arm pain from elbow to wrist, onset 1345 yesterday. Pt reports that she was cleaning her house when pain began. Pt reports that the pain was sharp, stabbing diffusely in LUE. Pt also left sided facial droop onset at about the same time as her arm pain. Pt reports her prior stroke left her with left sided deficits but they resolved. Pt reports additional symptom of chest pain but reports it resolved with nitro, morphine, and ASA administration by EMS. Pt states that recently OG found a blockage in her carotid artery, does not know why these tests were performed. Pt staes that she is on plavix and is compliant. Pt states she smokes. Per chart review there is a note of a tele stroke evaluation with similar symptoms for which she received thrombolytics.  There are no further records of this encounter available.  Patient states she does not remember what she was told during the encounter but states that all of the weakness including the face had resolved until yesterday.  PCP: AMBIKA Solorzano    The history is provided by the patient. No  was used.   Neurologic Problem  Primary symptoms do not include fever, nausea or vomiting. Primary symptoms comment: facial droop, arm pain. The symptoms began yesterday. The symptoms are unchanged. The neurological symptoms are focal. The symptoms occurred on exertion.   Additional symptoms include weakness. Medical issues also " include cerebral vascular accident.   Review of patient's allergies indicates:   Allergen Reactions    Penicillins Itching    Toradol [ketorolac] Itching    Tramadol Itching     No past medical history on file.  No past surgical history on file.  No family history on file.     Review of Systems   Constitutional:  Negative for chills and fever.   HENT:  Negative for sinus pain.    Respiratory:  Negative for cough, chest tightness and shortness of breath.    Cardiovascular:  Positive for chest pain.   Gastrointestinal:  Negative for abdominal pain, diarrhea, nausea and vomiting.   Genitourinary:  Negative for dysuria and hematuria.   Musculoskeletal:         Arm pain   Skin:  Negative for rash.   Neurological:  Positive for facial asymmetry, weakness and numbness. Negative for syncope.   All other systems reviewed and are negative.    Physical Exam     Initial Vitals [11/15/22 1714]   BP Pulse Resp Temp SpO2   116/82 87 18 98.3 °F (36.8 °C) 97 %      MAP       --         Physical Exam    Nursing note and vitals reviewed.  Constitutional: She appears well-developed and well-nourished. She is Obese . No distress.   HENT:   Head: Normocephalic and atraumatic.   Eyes: Conjunctivae are normal.   Cardiovascular:  Normal rate and intact distal pulses.           Pulmonary/Chest: No respiratory distress. She has no rhonchi.   Abdominal: Abdomen is soft. Bowel sounds are normal. There is no abdominal tenderness. There is no rebound and no guarding.   Musculoskeletal:         General: No edema.     Neurological: She is alert.   Left facial droop; 4/5 strength left arm;  4/5 strength left leg; decreased sensation to left face, left arm, and left leg; mild slurred speech   Skin: Skin is warm and dry.   Psychiatric: She has a normal mood and affect.       ED Course   Procedures  Labs Reviewed   COMPREHENSIVE METABOLIC PANEL - Abnormal; Notable for the following components:       Result Value    Glucose Level 135 (*)     Blood Urea  Nitrogen 5.5 (*)     Globulin 3.7 (*)     Albumin/Globulin Ratio 1.0 (*)     Aspartate Aminotransferase 46 (*)     All other components within normal limits   CBC WITH DIFFERENTIAL - Abnormal; Notable for the following components:    WBC 11.6 (*)     MCHC 31.8 (*)     Eos # 1.21 (*)     IG# 0.06 (*)     All other components within normal limits   B-TYPE NATRIURETIC PEPTIDE - Normal   TROPONIN I - Normal   CBC W/ AUTO DIFFERENTIAL    Narrative:     The following orders were created for panel order CBC Auto Differential.  Procedure                               Abnormality         Status                     ---------                               -----------         ------                     CBC with Differential[[id number]]        Abnormal            Final result                 Please view results for these tests on the individual orders.   TROPONIN I        ECG Results              EKG 12-lead (Preliminary result)  Result time 11/15/22 20:46:38      ED Interpretation by Anuj Perera MD (11/15/22 20:46:38, [facility] - Emergency Dept, Emergency Medicine)    EKG at 5:17 p.m..  79 beats per minute.  Incomplete right bundle branch block normal sinus rhythm                                  Imaging Results              CT Head Without Contrast (Final result)  Result time 11/15/22 21:40:13      Final result by Vick Velasquez MD (11/15/22 21:40:13)                   Impression:      No acute intracranial findings.      Electronically signed by: Vick Velasquez  Date:    11/15/2022  Time:    21:40               Narrative:    EXAMINATION:  CT HEAD WITHOUT CONTRAST    CLINICAL HISTORY:  stroke suspected; left sided weakness;    TECHNIQUE:  CT imaging of the head performed from the skull base to the vertex without intravenous contrast. DLP 1018 mGycm. Automatic exposure control, adjustment of mA/kV or iterative reconstruction technique was used to reduce radiation.    COMPARISON:  None  Available.    FINDINGS:  There is no acute cortical infarct, hemorrhage or mass lesion.  The ventricles are normal in size.  There are mild vascular calcifications.    Visualized paranasal sinuses and mastoid air cells are clear.                                       X-Ray Chest PA And Lateral (Final result)  Result time 11/15/22 17:42:47      Final result by John Sorenson MD (11/15/22 17:42:47)                   Impression:      No acute abnormality.      Electronically signed by: John Sorenson  Date:    11/15/2022  Time:    17:42               Narrative:    EXAMINATION:  XR CHEST PA AND LATERAL    CLINICAL HISTORY:  chest pain;    TECHNIQUE:  PA and lateral views of the chest were performed.    COMPARISON:  None    FINDINGS:  The lungs are clear, with normal appearance of pulmonary vasculature and no pleural effusion or pneumothorax.    The cardiac silhouette is normal in size. The hilar and mediastinal contours are unremarkable.    Bones are intact.                                       Medications   morphine injection 4 mg (has no administration in time range)   ondansetron injection 4 mg (has no administration in time range)     Medical Decision Making:   Initial Assessment:   Chest with left arm pain and left chest pain sounds more like patient has weakness in her left face left arm and left leg new at 1:00 p.m. yesterday afternoon.  Over 24 hours ago.  She states the pain is like a stabbing pins and needles pain which sounds consistent with paresthesias.  Does have a history of a CVA and states her previous deficits had completely resolved.  Chest pain has currently resolved.  Differential Diagnosis:   CVA, mi, dysrhythmia, noncompliance with medications, secondary gain all considered  Clinical Tests:   Lab Tests: Ordered and Reviewed  Radiological Study: Ordered and Reviewed  Medical Tests: Reviewed and Ordered  ED Management:  Patient received aspirin prior to arrival will give additional dose  of morphine for pain relief.  She is persistent weakness that she states is new I see that was documented on November 13th but I do not see what her exam was like when she was discharged.  Given these findings she will require admission further stroke evaluation she states she was told she has carotid blockage which could be contributing.  She is outside the window for thrombolytics or neurointerventional.  I have discussed this case with hospitalist service who will admit        Scribe Attestation:   Scribe #1: I performed the above scribed service and the documentation accurately describes the services I performed. I attest to the accuracy of the note.    Attending Attestation:           Physician Attestation for Scribe:  Physician Attestation Statement for Scribe #1: I, Anuj Perera MD, reviewed documentation, as scribed by Sanjiv Pascual in my presence, and it is both accurate and complete.                        Clinical Impression:   Final diagnoses:  [R07.9] Chest pain  [R29.90] Stroke-like symptoms (Primary)  [R53.1] Left-sided weakness  [Z72.0] Tobacco user        ED Disposition Condition    Admit Stable                Anuj Perera MD  11/15/22 2005

## 2022-11-16 NOTE — CONSULTS
Subjective:  She is a 52 y.o. female I am consulted to evaluate for acute on chronic L sided weakness.   Pt states that she has an AIS on septmeber of this year with L sided weakness, but earlier this weak, she feels that the L sided weakness is getting worse, with some chest pain, L jaw pain and pain on her L shoulder.  Say has been diagnosed with Bell's paly before but does not remember what side.           Patient Active Problem List    Diagnosis Date Noted    Acute ischemic stroke 10/13/2022    Lacunar stroke 07/29/2019     No past medical history on file.   No past surgical history on file.   Medications Prior to Admission   Medication Sig Dispense Refill Last Dose    ALPRAZolam (XANAX) 1 MG tablet Take 1 mg by mouth.       atorvastatin (LIPITOR) 80 MG tablet Take 80 mg by mouth once daily.       cloNIDine (CATAPRES) 0.1 MG tablet Take 0.1 mg by mouth daily as needed.       desvenlafaxine succinate (PRISTIQ) 25 mg Tb24 Take 50 mg by mouth once daily.       ergocalciferol (VITAMIN D2) 50,000 unit Cap Take 50,000 Units by mouth once a week.       famotidine (PEPCID) 40 MG tablet Take 40 mg by mouth nightly.       furosemide (LASIX) 20 MG tablet Take 20 mg by mouth once daily.       levothyroxine 200 mcg Cap Take 1 capsule by mouth once daily.       lisinopriL (PRINIVIL,ZESTRIL) 5 MG tablet Take 5 mg by mouth once daily.       metFORMIN (GLUCOPHAGE) 1000 MG tablet Take 1,000 mg by mouth 2 (two) times a day.       metoprolol succinate 100 mg CSpX Take 100 mg by mouth once daily.       QUEtiapine (SEROQUEL) 100 MG Tab Take 100 mg by mouth once daily.       rOPINIRole (REQUIP) 1 MG tablet Take 1 mg by mouth nightly.       semaglutide (OZEMPIC SUBQ)   0 Refill(s), Start Date: 11/10/22 17:08:00 CST       [DISCONTINUED] cyclobenzaprine (FLEXERIL) 5 MG tablet Take 5 mg by mouth 2 (two) times a day.       clopidogreL (PLAVIX) 75 mg tablet Take 75 mg by mouth once daily.       omeprazole (PRILOSEC) 20 MG capsule Take 20 mg  "by mouth once daily.       QUEtiapine (SEROQUEL) 300 MG Tab Take 600 mg by mouth every evening.       rOPINIRole (REQUIP) 1 MG tablet Take 0.5 mg by mouth once daily.        Review of patient's allergies indicates:   Allergen Reactions    Penicillins Itching    Toradol [ketorolac] Itching    Tramadol Itching      Social History     Tobacco Use    Smoking status: Not on file    Smokeless tobacco: Not on file   Substance Use Topics    Alcohol use: Not on file      No family history on file.     Review of Systems  head: NCAT, Skin warm and dry, breathing not labored, no knee or ankle swelling so signs of lymphedema, muscle bulk is normal, mood: good.      Objective:  Vital signs in last 24 hours:  Temp:  [98.9 °F (37.2 °C)] 98.9 °F (37.2 °C)  Pulse:  [69-85] 85  Resp:  [9-33] 18  SpO2:  [91 %-97 %] 95 %  BP: (109-153)/(74-93) 153/85      BP (!) 153/85   Pulse 85   Temp 98.9 °F (37.2 °C)   Resp 18   Ht 5' 5" (1.651 m)   Wt 116.6 kg (257 lb 0.9 oz)   SpO2 95%   BMI 42.78 kg/m²   On exam: pt is awake and alert, conversant, in NAD, mild L face droop, but also has trouble rising her L eye brow, no VF cut, EOMI, no slurred speech, no aphasia.  Minor pronator drift on the L, and minor 4/5 weakness on the LLE slighlty decreased sensation to touch and cold on the L compare to the L.   head: NCAT, Skin warm and dry, breathing not labored, no knee or ankle swelling so signs of lymphedema, muscle bulk is normal, mood: good.    Assessment/Plan:  Acute on chronic L sided weakness on 52 y O F with Hx of chronic IS on September.   Ddx: new AIS, UTI, sz with Real's, cardiac etiology.   MRI is negative for new AIS.   - will get an EEG and if no reason for her weakness, will get C spine as well.  - will re-examin in the AM and see if there is any change to direct us toward a specific diagnosis.   - positive UA   - I think we should resume her home meds, but I will defer to primary team.      "

## 2022-11-16 NOTE — PT/OT/SLP PROGRESS
Occupational Therapy      Patient Name:  Seema Ellis   MRN:  [id number]    OT eval attempted. Pt off the floor for MRI at this time. Will f/u as available.     11/16/2022

## 2022-11-16 NOTE — PT/OT/SLP PROGRESS
Attempted to see pt for speech, language and cognition evaluation; however, pt off the floor for a MRI. Will reattempt at later time schedule permitting.

## 2022-11-16 NOTE — PT/OT/SLP PROGRESS
Physical Therapy      Patient Name:  Seema Ellis   MRN:  [id number]    Pt in MRI. F/u tomorrow if appropriate.

## 2022-11-17 LAB
AV INDEX (PROSTH): 0.63
AV MEAN GRADIENT: 4 MMHG
AV PEAK GRADIENT: 8 MMHG
AV VALVE AREA: 1.99 CM2
AV VELOCITY RATIO: 0.66
BASOPHILS # BLD AUTO: 0.06 X10(3)/MCL (ref 0–0.2)
BASOPHILS NFR BLD AUTO: 0.7 %
BSA FOR ECHO PROCEDURE: 2.31 M2
CV ECHO LV RWT: 1.05 CM
DOP CALC AO PEAK VEL: 1.45 M/S
DOP CALC AO VTI: 28.2 CM
DOP CALC LVOT AREA: 3.1 CM2
DOP CALC LVOT DIAMETER: 2 CM
DOP CALC LVOT PEAK VEL: 0.95 M/S
DOP CALC LVOT STROKE VOLUME: 56.21 CM3
DOP CALC MV VTI: 25.1 CM
DOP CALCLVOT PEAK VEL VTI: 17.9 CM
E WAVE DECELERATION TIME: 177 MSEC
E/A RATIO: 0.86
E/E' RATIO: 9.47 M/S
ECHO LV POSTERIOR WALL: 1.85 CM (ref 0.6–1.1)
EJECTION FRACTION: 65 %
EOSINOPHIL # BLD AUTO: 0.62 X10(3)/MCL (ref 0–0.9)
EOSINOPHIL NFR BLD AUTO: 7 %
ERYTHROCYTE [DISTWIDTH] IN BLOOD BY AUTOMATED COUNT: 14.9 % (ref 11.5–17)
FRACTIONAL SHORTENING: 33 % (ref 28–44)
HCT VFR BLD AUTO: 43 % (ref 37–47)
HGB BLD-MCNC: 13.7 GM/DL (ref 12–16)
IMM GRANULOCYTES # BLD AUTO: 0.02 X10(3)/MCL (ref 0–0.04)
IMM GRANULOCYTES NFR BLD AUTO: 0.2 %
INTERVENTRICULAR SEPTUM: 1.49 CM (ref 0.6–1.1)
LEFT ATRIUM SIZE: 3.5 CM
LEFT ATRIUM VOLUME INDEX MOD: 16.8 ML/M2
LEFT ATRIUM VOLUME MOD: 36.9 CM3
LEFT INTERNAL DIMENSION IN SYSTOLE: 2.36 CM (ref 2.1–4)
LEFT VENTRICLE DIASTOLIC VOLUME INDEX: 23.59 ML/M2
LEFT VENTRICLE DIASTOLIC VOLUME: 51.9 ML
LEFT VENTRICLE MASS INDEX: 106 G/M2
LEFT VENTRICLE SYSTOLIC VOLUME INDEX: 8.8 ML/M2
LEFT VENTRICLE SYSTOLIC VOLUME: 19.3 ML
LEFT VENTRICULAR INTERNAL DIMENSION IN DIASTOLE: 3.53 CM (ref 3.5–6)
LEFT VENTRICULAR MASS: 233.77 G
LV LATERAL E/E' RATIO: 7.89 M/S
LV SEPTAL E/E' RATIO: 11.83 M/S
LVOT MG: 2 MMHG
LVOT MV: 0.62 CM/S
LYMPHOCYTES # BLD AUTO: 2.2 X10(3)/MCL (ref 0.6–4.6)
LYMPHOCYTES NFR BLD AUTO: 24.8 %
MAGNESIUM SERPL-MCNC: 2.2 MG/DL (ref 1.6–2.6)
MCH RBC QN AUTO: 29 PG (ref 27–31)
MCHC RBC AUTO-ENTMCNC: 31.9 MG/DL (ref 33–36)
MCV RBC AUTO: 91.1 FL (ref 80–94)
MONOCYTES # BLD AUTO: 0.54 X10(3)/MCL (ref 0.1–1.3)
MONOCYTES NFR BLD AUTO: 6.1 %
MV MEAN GRADIENT: 2 MMHG
MV PEAK A VEL: 0.83 M/S
MV PEAK E VEL: 0.71 M/S
MV PEAK GRADIENT: 4 MMHG
MV STENOSIS PRESSURE HALF TIME: 69 MS
MV VALVE AREA BY CONTINUITY EQUATION: 2.24 CM2
MV VALVE AREA P 1/2 METHOD: 3.19 CM2
NEUTROPHILS # BLD AUTO: 5.4 X10(3)/MCL (ref 2.1–9.2)
NEUTROPHILS NFR BLD AUTO: 61.2 %
NRBC BLD AUTO-RTO: 0 %
PLATELET # BLD AUTO: 330 X10(3)/MCL (ref 130–400)
PMV BLD AUTO: 9.6 FL (ref 7.4–10.4)
POCT GLUCOSE: 147 MG/DL (ref 70–110)
POCT GLUCOSE: 159 MG/DL (ref 70–110)
POCT GLUCOSE: 166 MG/DL (ref 70–110)
POCT GLUCOSE: 179 MG/DL (ref 70–110)
POTASSIUM SERPL-SCNC: 4.8 MMOL/L (ref 3.5–5.1)
PV PEAK VELOCITY: 1.21 CM/S
RA PRESSURE: 8 MMHG
RBC # BLD AUTO: 4.72 X10(6)/MCL (ref 4.2–5.4)
RIGHT VENTRICULAR END-DIASTOLIC DIMENSION: 2.75 CM
TDI LATERAL: 0.09 M/S
TDI SEPTAL: 0.06 M/S
TDI: 0.08 M/S
TRICUSPID ANNULAR PLANE SYSTOLIC EXCURSION: 2.26 CM
WBC # SPEC AUTO: 8.9 X10(3)/MCL (ref 4.5–11.5)

## 2022-11-17 PROCEDURE — 21400001 HC TELEMETRY ROOM

## 2022-11-17 PROCEDURE — 84132 ASSAY OF SERUM POTASSIUM: CPT | Performed by: NURSE PRACTITIONER

## 2022-11-17 PROCEDURE — 36415 COLL VENOUS BLD VENIPUNCTURE: CPT | Performed by: NURSE PRACTITIONER

## 2022-11-17 PROCEDURE — 99233 PR SUBSEQUENT HOSPITAL CARE,LEVL III: ICD-10-PCS | Mod: 95,,, | Performed by: PSYCHIATRY & NEUROLOGY

## 2022-11-17 PROCEDURE — 25000003 PHARM REV CODE 250: Performed by: INTERNAL MEDICINE

## 2022-11-17 PROCEDURE — 63600175 PHARM REV CODE 636 W HCPCS: Performed by: NURSE PRACTITIONER

## 2022-11-17 PROCEDURE — 97165 OT EVAL LOW COMPLEX 30 MIN: CPT

## 2022-11-17 PROCEDURE — 25000003 PHARM REV CODE 250: Performed by: NURSE PRACTITIONER

## 2022-11-17 PROCEDURE — 63600175 PHARM REV CODE 636 W HCPCS: Performed by: INTERNAL MEDICINE

## 2022-11-17 PROCEDURE — 85025 COMPLETE CBC W/AUTO DIFF WBC: CPT | Performed by: NURSE PRACTITIONER

## 2022-11-17 PROCEDURE — 97161 PT EVAL LOW COMPLEX 20 MIN: CPT

## 2022-11-17 PROCEDURE — 99233 SBSQ HOSP IP/OBS HIGH 50: CPT | Mod: 95,,, | Performed by: PSYCHIATRY & NEUROLOGY

## 2022-11-17 PROCEDURE — 83735 ASSAY OF MAGNESIUM: CPT | Performed by: NURSE PRACTITIONER

## 2022-11-17 RX ORDER — ATORVASTATIN CALCIUM 40 MG/1
80 TABLET, FILM COATED ORAL DAILY
Status: DISCONTINUED | OUTPATIENT
Start: 2022-11-17 | End: 2022-11-18 | Stop reason: HOSPADM

## 2022-11-17 RX ORDER — ALPRAZOLAM 0.5 MG/1
1 TABLET ORAL 3 TIMES DAILY PRN
Status: DISCONTINUED | OUTPATIENT
Start: 2022-11-17 | End: 2022-11-18 | Stop reason: HOSPADM

## 2022-11-17 RX ORDER — CLOPIDOGREL BISULFATE 75 MG/1
75 TABLET ORAL DAILY
Status: DISCONTINUED | OUTPATIENT
Start: 2022-11-17 | End: 2022-11-18 | Stop reason: HOSPADM

## 2022-11-17 RX ORDER — METFORMIN HYDROCHLORIDE 500 MG/1
1000 TABLET ORAL 2 TIMES DAILY
Status: DISCONTINUED | OUTPATIENT
Start: 2022-11-17 | End: 2022-11-18 | Stop reason: HOSPADM

## 2022-11-17 RX ORDER — FUROSEMIDE 20 MG/1
20 TABLET ORAL DAILY
Status: DISCONTINUED | OUTPATIENT
Start: 2022-11-17 | End: 2022-11-18 | Stop reason: HOSPADM

## 2022-11-17 RX ORDER — DESVENLAFAXINE SUCCINATE 50 MG/1
50 TABLET, EXTENDED RELEASE ORAL DAILY
Status: DISCONTINUED | OUTPATIENT
Start: 2022-11-17 | End: 2022-11-18 | Stop reason: HOSPADM

## 2022-11-17 RX ORDER — QUETIAPINE FUMARATE 100 MG/1
100 TABLET, FILM COATED ORAL DAILY
Status: DISCONTINUED | OUTPATIENT
Start: 2022-11-17 | End: 2022-11-18 | Stop reason: HOSPADM

## 2022-11-17 RX ORDER — ROPINIROLE 1 MG/1
1 TABLET, FILM COATED ORAL NIGHTLY
Status: DISCONTINUED | OUTPATIENT
Start: 2022-11-17 | End: 2022-11-18 | Stop reason: HOSPADM

## 2022-11-17 RX ORDER — QUETIAPINE FUMARATE 100 MG/1
600 TABLET, FILM COATED ORAL NIGHTLY
Status: DISCONTINUED | OUTPATIENT
Start: 2022-11-17 | End: 2022-11-18 | Stop reason: HOSPADM

## 2022-11-17 RX ORDER — INSULIN GLARGINE 100 [IU]/ML
35 INJECTION, SOLUTION SUBCUTANEOUS NIGHTLY
COMMUNITY

## 2022-11-17 RX ORDER — ENOXAPARIN SODIUM 100 MG/ML
40 INJECTION SUBCUTANEOUS EVERY 24 HOURS
Status: DISCONTINUED | OUTPATIENT
Start: 2022-11-17 | End: 2022-11-18 | Stop reason: HOSPADM

## 2022-11-17 RX ORDER — LISINOPRIL 5 MG/1
5 TABLET ORAL DAILY
Status: DISCONTINUED | OUTPATIENT
Start: 2022-11-17 | End: 2022-11-18 | Stop reason: HOSPADM

## 2022-11-17 RX ORDER — FAMOTIDINE 20 MG/1
40 TABLET, FILM COATED ORAL NIGHTLY
Status: DISCONTINUED | OUTPATIENT
Start: 2022-11-17 | End: 2022-11-18 | Stop reason: HOSPADM

## 2022-11-17 RX ORDER — PANTOPRAZOLE SODIUM 40 MG/1
40 TABLET, DELAYED RELEASE ORAL DAILY
Status: DISCONTINUED | OUTPATIENT
Start: 2022-11-17 | End: 2022-11-18 | Stop reason: HOSPADM

## 2022-11-17 RX ORDER — LEVOTHYROXINE SODIUM 200 UG/1
200 TABLET ORAL DAILY
Status: DISCONTINUED | OUTPATIENT
Start: 2022-11-17 | End: 2022-11-18 | Stop reason: HOSPADM

## 2022-11-17 RX ORDER — METOPROLOL SUCCINATE 50 MG/1
100 TABLET, EXTENDED RELEASE ORAL DAILY
Status: DISCONTINUED | OUTPATIENT
Start: 2022-11-17 | End: 2022-11-18 | Stop reason: HOSPADM

## 2022-11-17 RX ORDER — ROPINIROLE 0.25 MG/1
0.5 TABLET, FILM COATED ORAL DAILY
Status: DISCONTINUED | OUTPATIENT
Start: 2022-11-17 | End: 2022-11-18 | Stop reason: HOSPADM

## 2022-11-17 RX ORDER — INSULIN GLARGINE 100 [IU]/ML
35 INJECTION, SOLUTION SUBCUTANEOUS DAILY
COMMUNITY

## 2022-11-17 RX ADMIN — ENOXAPARIN SODIUM 40 MG: 40 INJECTION SUBCUTANEOUS at 04:11

## 2022-11-17 RX ADMIN — ASPIRIN 81 MG: 81 TABLET, COATED ORAL at 08:11

## 2022-11-17 RX ADMIN — HYDROCODONE BITARTRATE AND ACETAMINOPHEN 1 TABLET: 5; 325 TABLET ORAL at 08:11

## 2022-11-17 RX ADMIN — FUROSEMIDE 20 MG: 20 TABLET ORAL at 11:11

## 2022-11-17 RX ADMIN — HYDROCODONE BITARTRATE AND ACETAMINOPHEN 1 TABLET: 5; 325 TABLET ORAL at 01:11

## 2022-11-17 RX ADMIN — METFORMIN HYDROCHLORIDE 1000 MG: 500 TABLET, FILM COATED ORAL at 08:11

## 2022-11-17 RX ADMIN — CLOPIDOGREL BISULFATE 75 MG: 75 TABLET ORAL at 11:11

## 2022-11-17 RX ADMIN — LISINOPRIL 5 MG: 5 TABLET ORAL at 11:11

## 2022-11-17 RX ADMIN — QUETIAPINE FUMARATE 100 MG: 100 TABLET ORAL at 11:11

## 2022-11-17 RX ADMIN — METFORMIN HYDROCHLORIDE 1000 MG: 500 TABLET, FILM COATED ORAL at 11:11

## 2022-11-17 RX ADMIN — FAMOTIDINE 40 MG: 20 TABLET, FILM COATED ORAL at 08:11

## 2022-11-17 RX ADMIN — METOPROLOL SUCCINATE 100 MG: 50 TABLET, EXTENDED RELEASE ORAL at 11:11

## 2022-11-17 RX ADMIN — INSULIN ASPART 2 UNITS: 100 INJECTION, SOLUTION INTRAVENOUS; SUBCUTANEOUS at 01:11

## 2022-11-17 RX ADMIN — ROPINIROLE HYDROCHLORIDE 0.5 MG: 0.25 TABLET, FILM COATED ORAL at 11:11

## 2022-11-17 RX ADMIN — ATORVASTATIN CALCIUM 80 MG: 40 TABLET, FILM COATED ORAL at 11:11

## 2022-11-17 RX ADMIN — LEVOTHYROXINE SODIUM 200 MCG: 0.2 TABLET ORAL at 11:11

## 2022-11-17 RX ADMIN — QUETIAPINE FUMARATE 600 MG: 100 TABLET ORAL at 09:11

## 2022-11-17 RX ADMIN — MORPHINE SULFATE 4 MG: 4 INJECTION INTRAVENOUS at 11:11

## 2022-11-17 RX ADMIN — SODIUM CHLORIDE: 9 INJECTION, SOLUTION INTRAVENOUS at 10:11

## 2022-11-17 RX ADMIN — PANTOPRAZOLE SODIUM 40 MG: 40 TABLET, DELAYED RELEASE ORAL at 11:11

## 2022-11-17 RX ADMIN — ROPINIROLE HYDROCHLORIDE 1 MG: 1 TABLET, FILM COATED ORAL at 08:11

## 2022-11-17 RX ADMIN — DESVENLAFAXINE 50 MG: 50 TABLET, EXTENDED RELEASE ORAL at 11:11

## 2022-11-17 NOTE — PT/OT/SLP EVAL
Occupational Therapy   Evaluation and Discharge Note    Name: Seema Ellis  MRN: [id number]  Admitting Diagnosis:  <principal problem not specified>   Recent Surgery: * No surgery found *      Recommendations:     Discharge Recommendations:  home   Discharge Equipment Recommendations:   none   Barriers to discharge:       Assessment:     Seema Ellis is a 52 y.o. female with a medical diagnosis of acute on chronic L side weakness. MRI negative. At this time, patient is functioning at their prior level of function and does not require further acute OT services.     Plan:     During this hospitalization, patient does not require further acute OT services.  Please re-consult if situation changes.    Plan of Care Reviewed with: patient    Subjective     Chief Complaint: --  Patient/Family Comments/goals: --    Occupational Profile:  Living Environment: friends   Previous level of function: indep   Roles and Routines: does not drive or work; on disability   Equipment Used at home:     Assistance upon Discharge: --    Pain/Comfort:       Patients cultural, spiritual, Anabaptism conflicts given the current situation:      Objective:     Communicated with: nrsg prior to session.  Patient found HOB elevated with   upon OT entry to room.    General Precautions: Standard,     Orthopedic Precautions:    Braces:    Respiratory Status: Nasal cannula, flow 1 L/min     Occupational Performance:    Bed Mobility:    Patient completed Supine to Sit with stand by assistance    Functional Mobility/Transfers:  Patient completed Toilet Transfer Step Transfer technique with independence with  no AD  Functional Mobility: no LOB    Activities of Daily Living:  Grooming: independence .  Lower Body Dressing: independence .  Toileting: independence .    Cognitive/Visual Perceptual:  Cognitive/Psychosocial Skills:     -       Oriented to: Person, Place, and Time   Visual/Perceptual:      -pt reports blurry vision in L eye;  tracking and  visual fields WNL     Physical Exam:  Sensation:    -       Impaired  pt reports pain in L arm  Upper Extremity Strength:    -       Right Upper Extremity: WNL  -       Left Upper Extremity: -3 shoulder; 4/5 elbow and digits   Fine Motor Coordination:    -       Intact    AMPAC 6 Click ADL:  AMPAC Total Score:      Treatment & Education:  Educated pt on exercises for LUE. Pt in agreement.     Patient left up in chair with all lines intact and call button in reach    GOALS:   Multidisciplinary Problems       Occupational Therapy Goals          Problem: Occupational Therapy    Goal Priority Disciplines Outcome Interventions   Occupational Therapy Goal     OT, PT/OT                         History:     No past medical history on file.    No past surgical history on file.    Time Tracking:     OT Date of Treatment:    OT Start Time: 0924  OT Stop Time: 0942  OT Total Time (min): 18 min    Billable Minutes:Evaluation Low Complexity     11/17/2022

## 2022-11-17 NOTE — PLAN OF CARE
11/17/22 1120   Discharge Assessment   Assessment Type Discharge Planning Assessment   Confirmed/corrected address, phone number and insurance Yes   Confirmed Demographics Correct on Facesheet   Source of Information patient   Lives With friend(s)   Do you expect to return to your current living situation? Yes   Do you have help at home or someone to help you manage your care at home? Yes   Prior to hospitilization cognitive status: Alert/Oriented   Current cognitive status: Alert/Oriented   Walking or Climbing Stairs Difficulty none   Dressing/Bathing Difficulty none   Home Accessibility stairs within home   Number of Stairs, Within Home, Primary nine   Home Layout Able to live on 1st floor   Equipment Currently Used at Home glucometer   Readmission within 30 days? No   Patient currently being followed by outpatient case management? No   Do you currently have service(s) that help you manage your care at home? Yes   Name and Contact number of agency Current with [facility]   Is the pt/caregiver preference to resume services with current agency Yes   Do you take prescription medications? Yes   Do you have prescription coverage? Yes   Do you have any problems affording any of your prescribed medications? No   Is the patient taking medications as prescribed? yes   How do you get to doctors appointments? car, drives self;family or friend will provide   Are you on dialysis? No   Do you take coumadin? No   Discharge Plan A Home Health   Discharge Plan B Home with family   DME Needed Upon Discharge  none   Discharge Plan discussed with: Patient   Discharge Barriers Identified None   Patient lives with blind family friend and friend's 16 year old grandson that is home schooled. Home is a mobile home with 9 steps without railing upon entrance. DME in Home: Glucometer. Patient is current with [facility]  and would like to return. PCP: Dr. Gardner, Pharmacy: [facility]

## 2022-11-17 NOTE — PROGRESS NOTES
[facility] Hospital Medicine Progress Note        Chief Complaint: Inpatient follow-up on acute on chronic left hemiparesis    HPI:   Seema Ellis is a 52-year-old white female with a past medical history of  essential hypertension, hypothyroidism, non-insulin-dependent type 2 diabetes mellitus, morbid obesity - BMI 42.77, Bipolar II Disorder, restless legs syndrome, hyperlipidemia, tobacco abuse, ischemic stroke with residual left hemiplegia who presented to [facility] via EMS on 11/15/2022 with complaints of left arm pain that began at 1345 on 11/14/22 with associated chest pain and left sided facial droop. Left arm pain was described as stabbing pins and needles. She was given morphine 3 mg IV, Nitro Paste, Zofran 4mg IV, and aspirin via EMS.  Chest pain had resolved.  Of note, patient was seen an outlying facility with stroke-like symptoms on 10/13/22 and was given tPA with resolution of symptoms.  Bilateral carotid Doppler ultrasound with left internal carotid artery systolic velocities suggest a 50-69% luminal stenosis.     ED vital signs stable.  Labs notable for white blood cell count 11,600, erythrocyte sedimentation rate 32, BUN 5.5, glucose 135, C-reactive protein 14.23, thyroid stimulating hormone 6.4243, hemoglobin A1c 11.1%.  Initial troponin and repeat were undetectable.  Chest x-ray is negative. CT head is negative for acute intracranial findings.  CT angiogram of the head and neck negative for acute intracranial process. She was admitted to the hospital medicine service.      Interval Hx:   Patient is sitting up in a chair without any new complaints.  Nursing staff was in the room administering the patient's home medications.  Nursing staff just contacted me with reports of nonsustained ventricular tachycardia seen on telemetry.  Patient is asymptomatic.  Patient is afebrile, on room air, and hemodynamically stable.    Objective/physical exam:  General:  Morbidly obese  white female who appears older than her stated age in no acute distress  HENT: normocephalic, atraumatic  Eye: PERRL, EOMI, clear conjunctiva  Neck: full ROM, no thyromegaly, no JVD  Respiratory:  Diminished breath sounds bilaterally  Cardiovascular: regular rate and rhythm  Gastrointestinal: non-distended, positive bowel sounds, non-tender  Musculoskeletal: no gross deformity  Integumentary: warm, dry, intact, no rashes  Neurological: cranial nerves grossly intact, pre-existing left facial droop and left hemiparesis  Psychiatric: cooperative, flat affect, anxious and depressed    VITAL SIGNS: 24 HRS MIN & MAX LAST   Temp  Min: 97.9 °F (36.6 °C)  Max: 98.9 °F (37.2 °C) 97.9 °F (36.6 °C)   BP  Min: 147/84  Max: 177/88 (!) 158/92     Pulse  Min: 82  Max: 106  83   Resp  Min: 16  Max: 20 18   SpO2  Min: 91 %  Max: 95 % (!) 94 %         Recent Labs   Lab 11/15/22  1756 11/16/22  0321 11/17/22  0730   WBC 11.6* 9.2 8.9   RBC 4.95 4.73 4.72   HGB 14.4 13.6 13.7   HCT 45.3 43.5 43.0   MCV 91.5 92.0 91.1   MCH 29.1 28.8 29.0   MCHC 31.8* 31.3* 31.9*   RDW 15.0 15.1 14.9    309 330   MPV 9.8 9.8 9.6       Recent Labs   Lab 11/15/22  1755 11/15/22  2124     --    K 4.1  --    CO2 29  --    BUN 5.5*  --    CREATININE 0.75  --    CALCIUM 9.4  --    MG  --  1.90   ALBUMIN 3.8  --    ALKPHOS 127  --    ALT 45  --    AST 46*  --    BILITOT 0.4  --           Microbiology Results (last 7 days)       Procedure Component Value Units Date/Time    Urine culture [[id number]] Collected: 11/16/22 0923    Order Status: Completed Specimen: Urine Updated: 11/17/22 0809     Urine Culture No Growth At 24 Hours             See below for Radiology    Scheduled Med:   aspirin  81 mg Oral Daily    atorvastatin  80 mg Oral Daily    clopidogreL  75 mg Oral Daily    desvenlafaxine succinate  50 mg Oral Daily    famotidine  40 mg Oral Nightly    furosemide  20 mg Oral Daily    levothyroxine  200 mcg Oral Daily    lisinopriL  5 mg Oral Daily     metFORMIN  1,000 mg Oral BID    metoprolol succinate  100 mg Oral Daily    pantoprazole  40 mg Oral Daily    QUEtiapine  100 mg Oral Daily    QUEtiapine  600 mg Oral QHS    rOPINIRole  0.5 mg Oral Daily    rOPINIRole  1 mg Oral Nightly        Continuous Infusions:   sodium chloride 0.9% 75 mL/hr at 11/17/22 1045        PRN Meds:  acetaminophen, ALPRAZolam, glucagon (human recombinant), HYDROcodone-acetaminophen, insulin aspart U-100, labetalol, morphine       Assessment/Plan:  Acute on chronic left hemiparesis  History of old stroke with residual left hemiparesis  Tobacco abuse   Bipolar disorder   Essential hypertension   Type 2 insulin-dependent diabetes mellitus, poorly controlled   Morbid obesity   Hyperlipidemia  Hypothyroidism  Nonsustained ventricular tachycardia        Plan:   Request a Cardiology evaluation  Continue continuous cardiac monitoring  Follow-up Neurology evaluation and recommendations.  Patient is slated for an electroencephalogram.  Resumed appropriate home medications       VTE prophylaxis:  Lovenox    Patient condition:  Stable    Anticipated discharge and Disposition:     TBD    All diagnosis and differential diagnosis have been reviewed; assessment and plan has been documented; I have personally reviewed the labs and test results that are presently available; I have reviewed the patients medication list; I have reviewed the consulting providers response and recommendations. I have reviewed or attempted to review medical records based upon their availability    All of the patient's questions have been  addressed and answered. Patient's is agreeable to the above stated plan. I will continue to monitor closely and make adjustments to medical management as needed.  _____________________________________________________________________      Radiology:  Echo Saline Bubble? Yes  · Suboptimal bubble study; appears negative. History of PFO closure.  · Concentric hypertrophy and normal systolic  function.  · The estimated ejection fraction is 65%.  · Grade I left ventricular diastolic dysfunction.  · Normal right ventricular size with normal right ventricular systolic   function.  · Intermediate central venous pressure (8 mmHg).         Gerardo Walker MD   11/17/2022

## 2022-11-17 NOTE — PT/OT/SLP PROGRESS
Attempted to see pt for speech, language, and cognition evaluation; however, pt working with OT. SLP to reattempt at later time schedule permitting.

## 2022-11-17 NOTE — PT/OT/SLP EVAL
Physical Therapy Evaluation    Patient Name:  Seema Ellis   MRN:  [id number]    Recommendations:     Discharge Recommendations:  home health PT   Discharge Equipment Recommendations: walker, rolling   Barriers to discharge: None    Assessment:     Seema Ellis is a 52 y.o. female admitted with a medical diagnosis of acute on chronic L sided weakness. Ischemic infarct in September with residual LI sided weakness that increased.  She presents with the following impairments/functional limitations:  weakness, impaired endurance, impaired functional mobility, gait instability, impaired balance. Pt presents with mild weakness in L LE contributing to gait instability and increased endurance. Pt would benefit from use of RW to improve stability and independence. PT to follow for skilled interventions addressing deficits in order to allow safe discharge to home.    Rehab Prognosis: Good; patient would benefit from acute skilled PT services to address these deficits and reach maximum level of function.    Recent Surgery: * No surgery found *      Plan:     During this hospitalization, patient to be seen 6 x/week to address the identified rehab impairments via gait training, therapeutic activities, therapeutic exercises, neuromuscular re-education and progress toward the following goals:    Plan of Care Expires:  12/17/22    Subjective     Chief Complaint: none  Patient/Family Comments/goals: return home  Pain/Comfort:  Pain Rating 1: 0/10    Patients cultural, spiritual, Confucianism conflicts given the current situation:      Living Environment:  Pt lives in mobile home with 9 steps to enter. NO railings at this time but reports they are working to install. She did not use any AD and states HH PT was coming 2x's per week.  Upon discharge, patient will have assistance from family.    Objective:     Communicated with nurse prior to session.  Patient found up in chair with peripheral IV, oxygen  upon PT entry to  room.    General Precautions: Standard,     Orthopedic Precautions:    Braces: N/A  Respiratory Status: Nasal cannula, flow 1 L/min    Exams:  Cognitive Exam:  Patient is oriented to Person, Place, Time, and Situation  RLE Strength: WNL  LLE Strength: grossly 4-/5    Functional Mobility:  Transfers:     Sit to Stand:  contact guard assistance with rolling walker  Gait: 100ft with RW, CGA. Step through gait pattern with decreased foot clearance on L and mild circumduction for LE advancement  Balance: Unsupported standing with wide BRENDAN and R lateral trunk lean      Patient left up in chair with all lines intact and call button in reach.    GOALS:   Multidisciplinary Problems       Physical Therapy Goals          Problem: Physical Therapy    Goal Priority Disciplines Outcome Goal Variances Interventions   Physical Therapy Goal     PT, PT/OT Ongoing, Progressing     Description: Goals to be met by: 2022     Patient will increase functional independence with mobility by performin. Sit to stand transfer with Modified [place]  2. Bed to chair transfer with Modified [place] using Rolling Walker  3. Gait  x 500 feet with Modified [place] using Rolling Walker.   4. Ascend/Descend 9 steps without railing, SBA                         History:     No past medical history on file.    No past surgical history on file.    Time Tracking:     PT Received On:    PT Start Time: 1320     PT Stop Time: 1345  PT Total Time (min): 25 min     Billable Minutes: Evaluation (low)      2022

## 2022-11-17 NOTE — PLAN OF CARE
Problem: Physical Therapy  Goal: Physical Therapy Goal  Description: Goals to be met by: 2022     Patient will increase functional independence with mobility by performin. Sit to stand transfer with Modified [place]  2. Bed to chair transfer with Modified [place] using Rolling Walker  3. Gait  x 500 feet with Modified [place] using Rolling Walker.   4. Ascend/Descend 9 steps without railing, SBA    Outcome: Ongoing, Progressing

## 2022-11-17 NOTE — PLAN OF CARE
Problem: Physical Therapy  Goal: Physical Therapy Goal  Description: Goals to be met by: 2022     Patient will increase functional independence with mobility by performin. Sit to stand transfer with Modified [place]  2. Bed to chair transfer with Modified [place] using Rolling Walker  3. Gait  x 500 feet with Modified [place] using Rolling Walker.     Outcome: Ongoing, Progressing

## 2022-11-18 VITALS
HEART RATE: 73 BPM | OXYGEN SATURATION: 94 % | HEIGHT: 65 IN | BODY MASS INDEX: 42.83 KG/M2 | WEIGHT: 257.06 LBS | SYSTOLIC BLOOD PRESSURE: 136 MMHG | RESPIRATION RATE: 18 BRPM | TEMPERATURE: 98 F | DIASTOLIC BLOOD PRESSURE: 84 MMHG

## 2022-11-18 PROBLEM — M79.602 LEFT ARM PAIN: Status: ACTIVE | Noted: 2022-11-18

## 2022-11-18 LAB
BACTERIA UR CULT: NORMAL
LEFT CCA DIST DIAS: 28 CM/S
LEFT CCA DIST SYS: 81 CM/S
LEFT CCA PROX DIAS: 23 CM/S
LEFT CCA PROX SYS: 78 CM/S
LEFT ECA DIAS: 21 CM/S
LEFT ECA SYS: 237 CM/S
LEFT ICA DIST DIAS: 47 CM/S
LEFT ICA DIST SYS: 112 CM/S
LEFT ICA MID DIAS: 41 CM/S
LEFT ICA MID SYS: 104 CM/S
LEFT ICA PROX DIAS: 33 CM/S
LEFT ICA PROX SYS: 90 CM/S
LEFT VERTEBRAL DIAS: 16 CM/S
LEFT VERTEBRAL SYS: 60 CM/S
OHS CV CAROTID RIGHT ICA EDV HIGHEST: 34
OHS CV CAROTID ULTRASOUND LEFT ICA/CCA RATIO: 1.38
OHS CV CAROTID ULTRASOUND RIGHT ICA/CCA RATIO: 1.15
OHS CV PV CAROTID LEFT HIGHEST CCA: 81
OHS CV PV CAROTID LEFT HIGHEST ICA: 112
OHS CV PV CAROTID RIGHT HIGHEST CCA: 125
OHS CV PV CAROTID RIGHT HIGHEST ICA: 102
OHS CV US CAROTID LEFT HIGHEST EDV: 47
POCT GLUCOSE: 128 MG/DL (ref 70–110)
POCT GLUCOSE: 150 MG/DL (ref 70–110)
POCT GLUCOSE: 152 MG/DL (ref 70–110)
POCT GLUCOSE: 159 MG/DL (ref 70–110)
POCT GLUCOSE: 206 MG/DL (ref 70–110)
RIGHT CCA DIST DIAS: 20 CM/S
RIGHT CCA DIST SYS: 89 CM/S
RIGHT CCA PROX DIAS: 0 CM/S
RIGHT CCA PROX SYS: 125 CM/S
RIGHT ECA SYS: 79 CM/S
RIGHT ICA DIST DIAS: 34 CM/S
RIGHT ICA DIST SYS: 102 CM/S
RIGHT ICA MID DIAS: 13 CM/S
RIGHT ICA MID SYS: 71 CM/S
RIGHT ICA PROX DIAS: 19 CM/S
RIGHT ICA PROX SYS: 63 CM/S
RIGHT VERTEBRAL DIAS: 16 CM/S
RIGHT VERTEBRAL SYS: 62 CM/S

## 2022-11-18 PROCEDURE — 25000003 PHARM REV CODE 250: Performed by: INTERNAL MEDICINE

## 2022-11-18 PROCEDURE — 97530 THERAPEUTIC ACTIVITIES: CPT

## 2022-11-18 PROCEDURE — 63600175 PHARM REV CODE 636 W HCPCS: Performed by: NURSE PRACTITIONER

## 2022-11-18 PROCEDURE — 25000003 PHARM REV CODE 250: Performed by: NURSE PRACTITIONER

## 2022-11-18 PROCEDURE — 97116 GAIT TRAINING THERAPY: CPT

## 2022-11-18 RX ORDER — ASPIRIN 81 MG/1
81 TABLET ORAL DAILY
Qty: 100 TABLET | Refills: 0 | Status: SHIPPED | OUTPATIENT
Start: 2022-11-19

## 2022-11-18 RX ORDER — HYDROCODONE BITARTRATE AND ACETAMINOPHEN 5; 325 MG/1; MG/1
1 TABLET ORAL EVERY 6 HOURS PRN
Qty: 28 TABLET | Refills: 0 | Status: SHIPPED | OUTPATIENT
Start: 2022-11-18 | End: 2022-11-25

## 2022-11-18 RX ADMIN — ATORVASTATIN CALCIUM 80 MG: 40 TABLET, FILM COATED ORAL at 09:11

## 2022-11-18 RX ADMIN — CLOPIDOGREL BISULFATE 75 MG: 75 TABLET ORAL at 09:11

## 2022-11-18 RX ADMIN — LEVOTHYROXINE SODIUM 200 MCG: 0.2 TABLET ORAL at 09:11

## 2022-11-18 RX ADMIN — METOPROLOL SUCCINATE 100 MG: 50 TABLET, EXTENDED RELEASE ORAL at 09:11

## 2022-11-18 RX ADMIN — SODIUM CHLORIDE: 9 INJECTION, SOLUTION INTRAVENOUS at 12:11

## 2022-11-18 RX ADMIN — LISINOPRIL 5 MG: 5 TABLET ORAL at 09:11

## 2022-11-18 RX ADMIN — ROPINIROLE HYDROCHLORIDE 0.5 MG: 0.25 TABLET, FILM COATED ORAL at 09:11

## 2022-11-18 RX ADMIN — INSULIN ASPART 4 UNITS: 100 INJECTION, SOLUTION INTRAVENOUS; SUBCUTANEOUS at 12:11

## 2022-11-18 RX ADMIN — HYDROCODONE BITARTRATE AND ACETAMINOPHEN 1 TABLET: 5; 325 TABLET ORAL at 12:11

## 2022-11-18 RX ADMIN — PANTOPRAZOLE SODIUM 40 MG: 40 TABLET, DELAYED RELEASE ORAL at 09:11

## 2022-11-18 RX ADMIN — FUROSEMIDE 20 MG: 20 TABLET ORAL at 09:11

## 2022-11-18 RX ADMIN — QUETIAPINE FUMARATE 100 MG: 100 TABLET ORAL at 09:11

## 2022-11-18 RX ADMIN — DESVENLAFAXINE 50 MG: 50 TABLET, EXTENDED RELEASE ORAL at 09:11

## 2022-11-18 RX ADMIN — METFORMIN HYDROCHLORIDE 1000 MG: 500 TABLET, FILM COATED ORAL at 09:11

## 2022-11-18 RX ADMIN — ASPIRIN 81 MG: 81 TABLET, COATED ORAL at 09:11

## 2022-11-18 RX ADMIN — MORPHINE SULFATE 4 MG: 4 INJECTION INTRAVENOUS at 12:11

## 2022-11-18 NOTE — DISCHARGE SUMMARY
[facility]  Hospital Medicine Discharge Summary    Admit Date: 11/15/2022  Discharge Date and Time: 11/18/2022 2:41 PM  Admitting Physician:  Team  Discharging Physician: Gerardo Walker MD.  Primary Care Physician: No primary care provider on file.  Consults: Neurology    Discharge Diagnoses:  Acute on chronic left hemiparesis  Left arm pain  History of old stroke with residual left hemiparesis  Tobacco abuse   Bipolar disorder   Essential hypertension   Type 2 insulin-dependent diabetes mellitus, poorly controlled   Morbid obesity   Hyperlipidemia  Hypothyroidism  Nonsustained ventricular tachycardia    Hospital Course:   Seema Ellis is a 52-year-old white female with a past medical history of  essential hypertension, hypothyroidism, non-insulin-dependent type 2 diabetes mellitus, morbid obesity - BMI 42.77, bipolar disorder, restless legs syndrome, hyperlipidemia, tobacco abuse, ischemic stroke with residual left hemiplegia who presented to [facility] via EMS on 11/15/2022 with complaints of left arm pain that began at 1345 on 11/14/22 with associated chest pain and left sided facial droop. Left arm pain was described as stabbing pins and needles. She was given morphine 3 mg IV, Nitro Paste, Zofran 4mg IV, and aspirin via EMS.  Chest pain had resolved.  Of note, patient was seen an outlying facility with stroke-like symptoms on 10/13/22 and was given tPA with resolution of symptoms.  Bilateral carotid Doppler ultrasound with left internal carotid artery systolic velocities suggest a 50-69% luminal stenosis.     ED vital signs stable.  Labs notable for white blood cell count 11,600, erythrocyte sedimentation rate 32, BUN 5.5, glucose 135, C-reactive protein 14.23, thyroid stimulating hormone 6.4243, hemoglobin A1c 11.1%.  Initial troponin and repeat were undetectable.  Chest x-ray is negative. CT head is negative for acute intracranial findings.  CT angiogram of the head and  neck negative for acute intracranial process.  Patient was very promptly admitted to the hospital medicine service. MRI of the brain was negative for stroke. Neurology was consulted and recommended outpatient follow-up.    Patient was seen and examined on the day of discharge.      Vitals:  VITAL SIGNS: 24 HRS MIN & MAX LAST   Temp  Min: 97.5 °F (36.4 °C)  Max: 98.7 °F (37.1 °C) 98 °F (36.7 °C)   BP  Min: 97/65  Max: 140/79 (!) 140/79     Pulse  Min: 68  Max: 79  76   Resp  Min: 16  Max: 20 18   SpO2  Min: 92 %  Max: 95 % 95 %       Physical Exam:  General:  Morbidly obese white female who appears older than her stated age in no acute distress  HENT: normocephalic, atraumatic  Eye: PERRL, EOMI, clear conjunctiva  Neck: full ROM, no thyromegaly, no JVD  Respiratory:  Diminished breath sounds bilaterally  Cardiovascular: regular rate and rhythm  Gastrointestinal: non-distended, positive bowel sounds, non-tender  Musculoskeletal: no gross deformity  Integumentary: warm, dry, intact, no rashes  Neurological: cranial nerves grossly intact, pre-existing dysarthria, left facial droop, and left hemiparesis  Psychiatric: cooperative, flat affect, anxious and depressed    Procedures Performed: No admission procedures for hospital encounter.     Significant Diagnostic Studies: See Full reports for all details    Recent Labs   Lab 11/15/22  1756 11/16/22  0321 11/17/22  0730   WBC 11.6* 9.2 8.9   RBC 4.95 4.73 4.72   HGB 14.4 13.6 13.7   HCT 45.3 43.5 43.0   MCV 91.5 92.0 91.1   MCH 29.1 28.8 29.0   MCHC 31.8* 31.3* 31.9*   RDW 15.0 15.1 14.9    309 330   MPV 9.8 9.8 9.6       Recent Labs   Lab 11/15/22  1755 11/15/22  2124 11/17/22  1545     --   --    K 4.1  --  4.8   CO2 29  --   --    BUN 5.5*  --   --    CREATININE 0.75  --   --    CALCIUM 9.4  --   --    MG  --  1.90 2.20   ALBUMIN 3.8  --   --    ALKPHOS 127  --   --    ALT 45  --   --    AST 46*  --   --    BILITOT 0.4  --   --         Microbiology Results  (last 7 days)       Procedure Component Value Units Date/Time    Urine culture [[id number]] Collected: 11/16/22 0923    Order Status: Completed Specimen: Urine Updated: 11/18/22 0820     Urine Culture No Significant Growth             X-Ray Humerus 2 View Left  Narrative: EXAMINATION:  XR HUMERUS 2 VIEW LEFT    CLINICAL HISTORY:  arm pain;    COMPARISON:  None.    FINDINGS:  No acute displaced fractures or dislocations.    Joint spaces preserved.    No blastic or lytic lesions.    Soft tissues within normal limits.  Impression: No acute osseous abnormality.    Electronically signed by: Anand Sanchez  Date:    11/18/2022  Time:    09:38         Medication List        START taking these medications      aspirin 81 MG EC tablet  Commonly known as: ECOTRIN  Take 1 tablet (81 mg total) by mouth once daily.  Start taking on: November 19, 2022     HYDROcodone-acetaminophen 5-325 mg per tablet  Commonly known as: NORCO  Take 1 tablet by mouth every 6 (six) hours as needed for Pain.            CONTINUE taking these medications      ALPRAZolam 1 MG tablet  Commonly known as: XANAX     atorvastatin 80 MG tablet  Commonly known as: LIPITOR     cloNIDine 0.1 MG tablet  Commonly known as: CATAPRES     clopidogreL 75 mg tablet  Commonly known as: PLAVIX     desvenlafaxine succinate 25 mg Tb24  Commonly known as: PRISTIQ     furosemide 20 MG tablet  Commonly known as: LASIX     * insulin glargine 100 unit/mL injection  Commonly known as: Lantus     * insulin glargine 100 unit/mL injection  Commonly known as: Lantus     levothyroxine 200 mcg Cap     lisinopriL 5 MG tablet  Commonly known as: PRINIVIL,ZESTRIL     metFORMIN 1000 MG tablet  Commonly known as: GLUCOPHAGE     metoprolol succinate 100 mg Cspx     omeprazole 20 MG capsule  Commonly known as: PRILOSEC     OZEMPIC SUBQ     PEPCID 40 MG tablet  Generic drug: famotidine     * QUEtiapine 300 MG Tab  Commonly known as: SEROQUEL     * QUEtiapine 100 MG Tab  Commonly known as:  SEROQUEL     * rOPINIRole 1 MG tablet  Commonly known as: REQUIP     * rOPINIRole 1 MG tablet  Commonly known as: REQUIP     VITAMIN D2 50,000 unit Cap  Generic drug: ergocalciferol           * This list has 6 medication(s) that are the same as other medications prescribed for you. Read the directions carefully, and ask your doctor or other care provider to review them with you.                   Where to Get Your Medications        These medications were sent to [facility] - [address] [address]      Phone: [phone]   aspirin 81 MG EC tablet  HYDROcodone-acetaminophen 5-325 mg per tablet          Explained in detail to the patient about the discharge plan, medications, and follow-up visits.  Patient understands and agrees with the treatment plan.  Discharge Disposition:  Home with home health  Discharged Condition: stable  Diet-   Dietary Orders (From admission, onward)       Start     Ordered    11/16/22 1642  Diet diabetic  Diet effective now        Comments: Bite size, chopped    11/16/22 1642                     Activity as tolerated   Follow-up Information       Linda Reed MD Follow up in 4 week(s).    Specialty: Neurology  Contact information:  [address] [place] [address] [address]  [facility] [address]  [phone]               [facility] .    Specialty: Home Health Services  Contact information:  [address]  [address]  [facility] [address]  [phone]                           For further questions contact your home health agency    Discharge time 35 minutes    For worsening symptoms, chest pain, shortness of breath, increased abdominal pain, high grade fever, stroke or stroke like symptoms, immediately go to the nearest Emergency Room or call 911 as soon as possible.      Gerardo Edmond M.D, on 11/18/2022. at 2:41 PM.

## 2022-11-18 NOTE — PROGRESS NOTES
"S: NAEO.     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   The R side id strong.   head: NCAT, Skin warm and dry, breathing not labored, no knee or ankle swelling so signs of lymphedema, muscle bulk is normal, mood: good.      A/p:   - Acute on chronic L sided weakness.   - MRI brain is negative for a stroke.   - possible stroke recrudescence.   - may consider MRI C spine w wo as outpatient then f/u with general neurology as outpatient.   - continue current home meds.   - on DAPT and atorva. Agree   F/u with general neurology in 4-8 weeks from the discharge.     "

## 2022-11-18 NOTE — CONSULTS
Inpatient consult to Cardiology  Consult performed by: Raghu Simeon MD  Consult ordered by: Gerardo Walker MD    [facility] - 9th Floor Med Surg  Cardiology  Consult Note    Patient Name: Seema Ellis  MRN: [id number]  Admission Date: 11/15/2022  Hospital Length of Stay: 3 days  Code Status: Full Code   Attending Provider: Raghu Simeon MD   Consulting Provider: Vilma Hilton RN  Primary Care Physician: No primary care provider on file.  Principal Problem:<principal problem not specified>    Patient information was obtained from patient, past medical records, ER records, and primary team.     Subjective:     Reason for consult: NSVT    Chief Complaint:   Left arm pain with associated chest pain and left sided facial droop.    HPI: 51 yo WF unknown to [facility].  PMH essential hypertension, hypothyroid, TypeII DM, Morbid obesity, Bipolar II Disorder, restless legs syndrome, hyperlipidemia, tobacco abuse, ischemic stroke with residual left hemiplegia.  Presented to ED via EMS on 11/15/22 with left arm pain with associated chest pain and left sided facial droop.  She received morphine, nitro paste, Zofran, and ASA.  CP was resolved. Patient has received tPA on 10/13/22 at outlying facility for stroke-like symptomas, with resolution of symptoms.  Bilateral carotid US velocities showed left internal carotid artery stenosis of 50-69%.  Troponin negative.  Chest Xray negative.  CT of head and CTA of head and neck was negative for acute intracranial process negative.    Patient had a 7 beat run of NSVT with atrial arrthymia (same morphology as native R-R) noted on telemetry. K+-4.8, Mg-2.2, TSH-6.2.  Echo (11/16/22) EF-65% with history of PFO closure.  Patient was started on Metoprolol 100 mg daily.     PMH: Hypertension, hypothyroid, Type II DM, Morbid obesity, Bipolar II disorder, restless leg syndrome, hyperlipidemia, CVA  PSH: Denies surgical history per medical record  Family  History: Non-contributory per medical record  Social History: 1/2 PPD cigarettes, Denies alcohol or illicit drug use.    Previous Cardiac Diagnostics:   Echo (11/16/22):  Suboptimal bubble study; appears negative. History of PFO closure.  Concentric hypertrophy and normal systolic function.  The estimated ejection fraction is 65%.  Grade I left ventricular diastolic dysfunction.  Normal right ventricular size with normal right ventricular systolic function.  Intermediate central venous pressure (8 mmHg).    Bilateral carotid US (11/16/22)  Right Carotid There is no significant plaque in the right proximal common carotid artery.     There is mild calcific plaque in the right distal internal carotid artery.     The highest right ICA velocity divided by the right distal CCA velocity is 1.15 .   Left Carotid There is no significant plaque in the left distal common carotid artery.    There is mild calcific plaque in the left distal internal carotid artery.    The highest left ICA velocity divided by the left distal CCA velocity is 1.38.             Review of patient's allergies indicates:   Allergen Reactions    Penicillins Itching    Toradol [ketorolac] Itching    Tramadol Itching       No current facility-administered medications on file prior to encounter.     Current Outpatient Medications on File Prior to Encounter   Medication Sig    ALPRAZolam (XANAX) 1 MG tablet Take 1 mg by mouth.    atorvastatin (LIPITOR) 80 MG tablet Take 80 mg by mouth once daily.    cloNIDine (CATAPRES) 0.1 MG tablet Take 0.1 mg by mouth daily as needed.    desvenlafaxine succinate (PRISTIQ) 25 mg Tb24 Take 50 mg by mouth once daily.    ergocalciferol (VITAMIN D2) 50,000 unit Cap Take 50,000 Units by mouth once a week.    famotidine (PEPCID) 40 MG tablet Take 40 mg by mouth nightly.    furosemide (LASIX) 20 MG tablet Take 20 mg by mouth once daily.    levothyroxine 200 mcg Cap Take 1 capsule by mouth once daily.    lisinopriL  (PRINIVIL,ZESTRIL) 5 MG tablet Take 5 mg by mouth once daily.    metFORMIN (GLUCOPHAGE) 1000 MG tablet Take 1,000 mg by mouth 2 (two) times a day.    metoprolol succinate 100 mg CSpX Take 100 mg by mouth once daily.    QUEtiapine (SEROQUEL) 100 MG Tab Take 100 mg by mouth once daily.    rOPINIRole (REQUIP) 1 MG tablet Take 1 mg by mouth nightly.    semaglutide (OZEMPIC SUBQ)   0 Refill(s), Start Date: 11/10/22 17:08:00 CST    clopidogreL (PLAVIX) 75 mg tablet Take 75 mg by mouth once daily.    insulin glargine (LANTUS) 100 unit/mL injection Inject 35 Units into the skin every evening.    insulin glargine (LANTUS) 100 unit/mL injection Inject 35 Units into the skin once daily.    omeprazole (PRILOSEC) 20 MG capsule Take 20 mg by mouth once daily.    QUEtiapine (SEROQUEL) 300 MG Tab Take 600 mg by mouth every evening.    rOPINIRole (REQUIP) 1 MG tablet Take 0.5 mg by mouth once daily.     Family History    None       Tobacco Use    Smoking status: Not on file    Smokeless tobacco: Not on file   Substance and Sexual Activity    Alcohol use: Not on file    Drug use: Not on file    Sexual activity: Not on file       Review of Systems   Constitutional:  Positive for fatigue.   HENT: Negative.     Respiratory: Negative.     Cardiovascular: Negative.    Gastrointestinal: Negative.    Genitourinary: Negative.    Skin: Negative.    Neurological:         Left arm weakness; left facial droop   Psychiatric/Behavioral: Negative.       Objective:     Vital Signs (Most Recent):  Temp: 97.6 °F (36.4 °C) (11/18/22 0730)  Pulse: 77 (11/18/22 0730)  Resp: 18 (11/18/22 0730)  BP: 114/69 (11/18/22 0730)  SpO2: (!) 93 % (11/18/22 0730)   Vital Signs (24h Range):  Temp:  [97.5 °F (36.4 °C)-98.7 °F (37.1 °C)] 97.6 °F (36.4 °C)  Pulse:  [68-83] 77  Resp:  [16-20] 18  SpO2:  [92 %-94 %] 93 %  BP: ()/(65-92) 114/69     Weight: 116.6 kg (257 lb 0.9 oz)  Body mass index is 42.78 kg/m².    SpO2: (!) 93 %  O2 Device (Oxygen Therapy): room  air    No intake or output data in the 24 hours ending 11/18/22 0804    Lines/Drains/Airways       Peripheral Intravenous Line  Duration                  Peripheral IV - Single Lumen 11/15/22 20 G Left Antecubital 3 days                    Significant Labs:  Recent Results (from the past 72 hour(s))   Comprehensive Metabolic Panel    Collection Time: 11/15/22  5:55 PM   Result Value Ref Range    Sodium Level 136 136 - 145 mmol/L    Potassium Level 4.1 3.5 - 5.1 mmol/L    Chloride 98 98 - 107 mmol/L    Carbon Dioxide 29 22 - 29 mmol/L    Glucose Level 135 (H) 74 - 100 mg/dL    Blood Urea Nitrogen 5.5 (L) 9.8 - 20.1 mg/dL    Creatinine 0.75 0.55 - 1.02 mg/dL    Calcium Level Total 9.4 8.4 - 10.2 mg/dL    Protein Total 7.5 6.4 - 8.3 gm/dL    Albumin Level 3.8 3.5 - 5.0 gm/dL    Globulin 3.7 (H) 2.4 - 3.5 gm/dL    Albumin/Globulin Ratio 1.0 (L) 1.1 - 2.0 ratio    Bilirubin Total 0.4 <=1.5 mg/dL    Alkaline Phosphatase 127 40 - 150 unit/L    Alanine Aminotransferase 45 0 - 55 unit/L    Aspartate Aminotransferase 46 (H) 5 - 34 unit/L    eGFR >60 mls/min/1.73/m2   BNP    Collection Time: 11/15/22  5:55 PM   Result Value Ref Range    Natriuretic Peptide <10.0 <=100.0 pg/mL   Troponin I    Collection Time: 11/15/22  5:55 PM   Result Value Ref Range    Troponin-I <0.010 0.000 - 0.045 ng/mL   CBC with Differential    Collection Time: 11/15/22  5:56 PM   Result Value Ref Range    WBC 11.6 (H) 4.5 - 11.5 x10(3)/mcL    RBC 4.95 4.20 - 5.40 x10(6)/mcL    Hgb 14.4 12.0 - 16.0 gm/dL    Hct 45.3 37.0 - 47.0 %    MCV 91.5 80.0 - 94.0 fL    MCH 29.1 27.0 - 31.0 pg    MCHC 31.8 (L) 33.0 - 36.0 mg/dL    RDW 15.0 11.5 - 17.0 %    Platelet 339 130 - 400 x10(3)/mcL    MPV 9.8 7.4 - 10.4 fL    Neut % 56.8 %    Lymph % 25.9 %    Mono % 5.5 %    Eos % 10.5 %    Basophil % 0.8 %    Lymph # 2.99 0.6 - 4.6 x10(3)/mcL    Neut # 6.6 2.1 - 9.2 x10(3)/mcL    Mono # 0.63 0.1 - 1.3 x10(3)/mcL    Eos # 1.21 (H) 0 - 0.9 x10(3)/mcL    Baso # 0.09 0 - 0.2  x10(3)/mcL    IG# 0.06 (H) 0 - 0.04 x10(3)/mcL    IG% 0.5 %    NRBC% 0.0 %   Troponin I    Collection Time: 11/15/22  9:24 PM   Result Value Ref Range    Troponin-I <0.010 0.000 - 0.045 ng/mL   Lipid panel    Collection Time: 11/15/22  9:24 PM   Result Value Ref Range    Cholesterol Total 131 <=200 mg/dL    HDL Cholesterol 35 35 - 60 mg/dL    Triglyceride 129 37 - 140 mg/dL    Cholesterol/HDL Ratio 4 0 - 5    Very Low Density Lipoprotein 26     LDL Cholesterol 70.00 50.00 - 140.00 mg/dL   TSH    Collection Time: 11/15/22  9:24 PM   Result Value Ref Range    Thyroid Stimulating Hormone 6.4243 (H) 0.3500 - 4.9400 uIU/mL   High sensitivity CRP    Collection Time: 11/15/22  9:24 PM   Result Value Ref Range    C-Reactive Protein High Sensitivity 14.23 (H) <=5.00 mg/L   Magnesium    Collection Time: 11/15/22  9:24 PM   Result Value Ref Range    Magnesium Level 1.90 1.60 - 2.60 mg/dL   CK-MB    Collection Time: 11/15/22  9:24 PM   Result Value Ref Range    Creatine Kinase MB 0.8 <=3.4 ng/mL   Sedimentation rate    Collection Time: 11/15/22 10:35 PM   Result Value Ref Range    Sed Rate 32 (H) 0 - 20 mm/hr   Hemoglobin A1C    Collection Time: 11/15/22 10:35 PM   Result Value Ref Range    Hemoglobin A1c 11.1 (H) <=7.0 %    Estimated Average Glucose 271.9 mg/dL   Troponin I    Collection Time: 11/16/22  3:21 AM   Result Value Ref Range    Troponin-I <0.010 0.000 - 0.045 ng/mL   APTT    Collection Time: 11/16/22  3:21 AM   Result Value Ref Range    PTT 30.1 23.2 - 33.7 seconds   Protime-INR    Collection Time: 11/16/22  3:21 AM   Result Value Ref Range    PT 12.9 12.5 - 14.5 seconds    INR 0.98 0.00 - 1.30   CBC with Differential    Collection Time: 11/16/22  3:21 AM   Result Value Ref Range    WBC 9.2 4.5 - 11.5 x10(3)/mcL    RBC 4.73 4.20 - 5.40 x10(6)/mcL    Hgb 13.6 12.0 - 16.0 gm/dL    Hct 43.5 37.0 - 47.0 %    MCV 92.0 80.0 - 94.0 fL    MCH 28.8 27.0 - 31.0 pg    MCHC 31.3 (L) 33.0 - 36.0 mg/dL    RDW 15.1 11.5 - 17.0 %     Platelet 309 130 - 400 x10(3)/mcL    MPV 9.8 7.4 - 10.4 fL    Neut % 55.0 %    Lymph % 28.3 %    Mono % 6.3 %    Eos % 9.6 %    Basophil % 0.5 %    Lymph # 2.60 0.6 - 4.6 x10(3)/mcL    Neut # 5.1 2.1 - 9.2 x10(3)/mcL    Mono # 0.58 0.1 - 1.3 x10(3)/mcL    Eos # 0.88 0 - 0.9 x10(3)/mcL    Baso # 0.05 0 - 0.2 x10(3)/mcL    IG# 0.03 0 - 0.04 x10(3)/mcL    IG% 0.3 %    NRBC% 0.0 %   CV Ultrasound Bilateral Doppler Carotid    Collection Time: 11/16/22  8:24 AM   Result Value Ref Range    Left ICA/CCA ratio 1.38     Right ICA/CCA ratio 1.15     Left Highest .00     Left Highest CCA 81     Right Highest .00     Right Highest      Right Highest EDV 34.00     LT Highest EDV 47.00     Right CCA prox sys 125 cm/s    Right CCA prox arredondo 0 cm/s    Right CCA dist sys 89 cm/s    Right CCA dist arredondo 20 cm/s    Right ICA prox sys 63 cm/s    Right ICA prox arredondo 19 cm/s    Right ICA mid sys 71 cm/s    Right ICA mid arredondo 13 cm/s    Right ICA dist sys 102 cm/s    Right ICA dist arredondo 34 cm/s    Right ECA sys 79 cm/s    Right vertebral sys 62 cm/s    Left CCA prox sys 78 cm/s    Left CCA prox arredondo 23 cm/s    Left CCA dist sys 81 cm/s    Left CCA dist arredondo 28 cm/s    Left ICA prox sys 90 cm/s    Left ICA prox arredondo 33 cm/s    Left ICA mid sys 104 cm/s    Left ICA mid arredondo 41 cm/s    Left ICA dist sys 112 cm/s    Left ICA dist arredondo 47 cm/s    Left ECA sys 237 cm/s    Left vertebral sys 60 cm/s    Right vertebral arredondo 16 cm/s    Left vertebral arredondo 16 cm/s    Left ECA arredondo 21 cm/s   Urinalysis, Reflex to Urine Culture Urine, Clean Catch    Collection Time: 11/16/22  9:23 AM    Specimen: Urine   Result Value Ref Range    Color, UA Yellow Yellow, Light-Yellow, Dark Yellow, Jayleen, Straw    Appearance, UA Clear Clear    Specific Gravity, UA >=1.040 (H) 1.001 - 1.030    pH, UA 5.5 5.0 - 8.5    Protein, UA Negative Negative mg/dL    Glucose, UA Negative Negative, Normal mg/dL    Ketones, UA Negative Negative mg/dL    Blood,  UA Negative Negative unit/L    Bilirubin, UA Negative Negative mg/dL    Urobilinogen, UA 0.2 0.2, 1.0, Normal mg/dL    Nitrites, UA Negative Negative    Leukocyte Esterase, UA 2+ (A) Negative unit/L   Urinalysis, Microscopic    Collection Time: 11/16/22  9:23 AM   Result Value Ref Range    RBC, UA <5 <=5 /HPF    WBC, UA 75 (H) <=5 /HPF    Squamous Epithelial Cells, UA <5 <=5 /HPF    Bacteria, UA None Seen None Seen, Rare, Occasional /HPF   Urine culture    Collection Time: 11/16/22  9:23 AM    Specimen: Urine   Result Value Ref Range    Urine Culture No Growth At 24 Hours    POCT glucose    Collection Time: 11/16/22 10:41 AM   Result Value Ref Range    POCT Glucose 150 (H) 70 - 110 mg/dL   Echo Saline Bubble? Yes    Collection Time: 11/16/22  1:48 PM   Result Value Ref Range    BSA 2.31 m2    TDI SEPTAL 0.06 m/s    LV LATERAL E/E' RATIO 7.89 m/s    LV SEPTAL E/E' RATIO 11.83 m/s    Right Atrial Pressure (from IVC) 8 mmHg    EF 65 %    Left Ventricular Outflow Tract Mean Velocity 0.62 cm/s    Left Ventricular Outflow Tract Mean Gradient 2.00 mmHg    TDI LATERAL 0.09 m/s    PV PEAK VELOCITY 1.21 cm/s    LVIDd 3.53 3.5 - 6.0 cm    IVS 1.49 (A) 0.6 - 1.1 cm    Posterior Wall 1.85 (A) 0.6 - 1.1 cm    LVIDs 2.36 2.1 - 4.0 cm    FS 33 28 - 44 %    LV mass 233.77 g    LA size 3.50 cm    RVDD 2.75 cm    TAPSE 2.26 cm    Left Ventricle Relative Wall Thickness 1.05 cm    AV mean gradient 4 mmHg    AV valve area 1.99 cm2    AV Velocity Ratio 0.66     AV index (prosthetic) 0.63     MV mean gradient 2 mmHg    MV valve area p 1/2 method 3.19 cm2    MV valve area by continuity eq 2.24 cm2    E/A ratio 0.86     Mean e' 0.08 m/s    E wave deceleration time 177.00 msec    LVOT diameter 2.00 cm    LVOT area 3.1 cm2    LVOT peak js 0.95 m/s    LVOT peak VTI 17.90 cm    Ao peak js 1.45 m/s    Ao VTI 28.2 cm    LVOT stroke volume 56.21 cm3    AV peak gradient 8 mmHg    MV peak gradient 4 mmHg    E/E' ratio 9.47 m/s    MV Peak E Js 0.71  m/s    MV VTI 25.1 cm    MV stenosis pressure 1/2 time 69.00 ms    MV Peak A Js 0.83 m/s    LV Systolic Volume 19.30 mL    LV Systolic Volume Index 8.8 mL/m2    LV Diastolic Volume 51.90 mL    LV Diastolic Volume Index 23.59 mL/m2    LV Mass Index 106 g/m2    LA Volume Index (Mod) 16.8 mL/m2    LA volume (mod) 36.90 cm3   POCT glucose    Collection Time: 11/16/22  4:56 PM   Result Value Ref Range    POCT Glucose 100 70 - 110 mg/dL   POCT glucose    Collection Time: 11/17/22  1:06 AM   Result Value Ref Range    POCT Glucose 147 (H) 70 - 110 mg/dL   POCT glucose    Collection Time: 11/17/22  6:43 AM   Result Value Ref Range    POCT Glucose 159 (H) 70 - 110 mg/dL   CBC with Differential    Collection Time: 11/17/22  7:30 AM   Result Value Ref Range    WBC 8.9 4.5 - 11.5 x10(3)/mcL    RBC 4.72 4.20 - 5.40 x10(6)/mcL    Hgb 13.7 12.0 - 16.0 gm/dL    Hct 43.0 37.0 - 47.0 %    MCV 91.1 80.0 - 94.0 fL    MCH 29.0 27.0 - 31.0 pg    MCHC 31.9 (L) 33.0 - 36.0 mg/dL    RDW 14.9 11.5 - 17.0 %    Platelet 330 130 - 400 x10(3)/mcL    MPV 9.6 7.4 - 10.4 fL    Neut % 61.2 %    Lymph % 24.8 %    Mono % 6.1 %    Eos % 7.0 %    Basophil % 0.7 %    Lymph # 2.20 0.6 - 4.6 x10(3)/mcL    Neut # 5.4 2.1 - 9.2 x10(3)/mcL    Mono # 0.54 0.1 - 1.3 x10(3)/mcL    Eos # 0.62 0 - 0.9 x10(3)/mcL    Baso # 0.06 0 - 0.2 x10(3)/mcL    IG# 0.02 0 - 0.04 x10(3)/mcL    IG% 0.2 %    NRBC% 0.0 %   POCT glucose    Collection Time: 11/17/22 11:48 AM   Result Value Ref Range    POCT Glucose 179 (H) 70 - 110 mg/dL   Potassium    Collection Time: 11/17/22  3:45 PM   Result Value Ref Range    Potassium Level 4.8 3.5 - 5.1 mmol/L   Magnesium    Collection Time: 11/17/22  3:45 PM   Result Value Ref Range    Magnesium Level 2.20 1.60 - 2.60 mg/dL   POCT glucose    Collection Time: 11/17/22  6:33 PM   Result Value Ref Range    POCT Glucose 166 (H) 70 - 110 mg/dL   POCT glucose    Collection Time: 11/18/22 12:58 AM   Result Value Ref Range    POCT Glucose 128 (H) 70  - 110 mg/dL   POCT glucose    Collection Time: 11/18/22  4:22 AM   Result Value Ref Range    POCT Glucose 159 (H) 70 - 110 mg/dL     EKG:    Significant Imaging:  Imaging Results              MRI Brain Without Contrast (Final result)  Result time 11/16/22 10:41:18      Final result by Stephanie Day MD (11/16/22 10:41:18)                   Impression:      No acute intracranial abnormality.      Electronically signed by: Stephanie Day  Date:    11/16/2022  Time:    10:41               Narrative:    EXAMINATION:  MRI BRAIN WITHOUT CONTRAST    CLINICAL HISTORY:  Stroke, follow up;    TECHNIQUE:  Multiplanar, multisequence MR images of the brain were obtained without the administration of intravenous contrast.    COMPARISON:  CT head dated 11/15/2022    FINDINGS:  There is no restricted diffusion, hemorrhage or edema.  There are minimal scattered T2/FLAIR hyperintensities in the subcortical and periventricular white matter.    There is no mass effect or midline shift.  The basal cisterns are patent.  There is mild diffuse parenchymal volume loss.  There is no hydrocephalus or abnormal extra-axial fluid collection.  The major intracranial flow voids are patent.  The paranasal sinuses are clear.  There is a small right mastoid effusion.                                       CTA Head and Neck (xpd) (Final result)  Result time 11/16/22 06:25:35   Procedure changed from CTA Head     Final result by Tomy Santiago MD (11/16/22 06:25:35)                   Impression:    Impression:    1. No hemodynamically significant stenosis, aneurysm or vascular malformation is seen. No acute intracranial process is seen. Details and findings as noted above.    No significant discrepancy with overnight report.      Electronically signed by: Tomy Santiago  Date:    11/16/2022  Time:    06:25               Narrative:      Technique:CT angiogram of the intracranial vessels was performed with intravenous contrast with direct axial as  well as sagittal and coronal reformations. CT angiogram of the neck vessels was performed with intravenous contrast with direct axial as well as sagittal and coronal reformations.  MIP and MPR images were also reconstructed.    Automated exposure control was utilized to minimize radiation dose.  [facility] 01/09/2019    Comparison:Comparison is with study dated 2022-11-15 21:33:47.    Clinical history:Left facial droop, weakness.    Findings:    Carotid arteries were assessed in accordance with the NASCET criteria.    Intracranial Vascular structures:    Internal carotid arteries:Mild atheromatous calcification of the cavernous clinoid segment of the bilateral internal carotid artery is seen with mild stenosis.    Middle cerebral arteries:Unremarkable.    Anterior cerebral arteries:Unremarkable.    Vertebral arteries:Left vertebral artery is dominant. The vertebrobasilar junction is unremarkable.    Basilar artery:Unremarkable.    Posterior cerebral arteries:Unremarkable.    Posterior communicating arteries:Unremarkable.    Neck Vascular structures:The visualized aorta and origin of the great vessels of the neck appear unremarkable. There is direct origin of the left vertebral artery from the aortic arch.    Carotids:    Common carotid arteries:Unremarkable.    Internal carotid artery:Left internal carotid artery is unremarkable. Subtle atheromatous calcification without significant stenosis at the origin of the right internal carotid artery is seen.    Vertebral arteries:Left vertebral artery is dominant.    Jugular Veins and venous sinuses:Unremarkable.    Hemorrhage:No acute intracranial hemorrhage is seen.    CSF spaces:The ventricles sulci and basal cisterns are within normal limits.    Brain parenchyma:There is preservation of the grey white junction throughout.    Cerebellum:Unremarkable.    Sella and skull base:The sella appears to be within normal limits.    Intracranial calcifications:Incidental note is made of  bilateral choroid plexus calcification. Incidental note is made of some pineal region calcification.                        Preliminary result by Tomy Santiago MD (11/15/22 23:37:10)                   Narrative:    START OF REPORT:  Technique: CT angiogram of the intracranial vessels was performed with intravenous contrast with direct axial as well as sagittal and coronal reformations. CT angiogram of the neck vessels was performed with intravenous contrast with direct axial as well as sagittal and coronal reformations.    Comparison: Comparison is with study dated â2022-11-15 21:33:47â.    Clinical history: Left facial droop, weakness.    Findings:  Intracranial Vascular structures:  Internal carotid arteries: Mild atheromatous calcification of the cavernous clinoid segment of the bilateral internal carotid artery is seen with mild stenosis.  Middle cerebral arteries: Unremarkable.  Anterior cerebral arteries: Unremarkable.  Vertebral arteries: Left vertebral artery is dominant. The vertebrobasilar junction is unremarkable.  Basilar artery: Unremarkable.  Posterior cerebral arteries: Unremarkable.  Posterior communicating arteries: Unremarkable.  Neck Vascular structures: The visualized aorta and origin of the great vessels of the neck appear unremarkable. There is direct origin of the left vertebral artery from the aortic arch.  Carotids:  Common carotid arteries: Unremarkable.  Internal carotid artery: Left internal carotid artery is unremarkable. Subtle atheromatous calcification without significant stenosis at the origin of the right internal carotid artery is seen.  Vertebral arteries: Left vertebral artery is dominant.  Jugular Veins and venous sinuses: Unremarkable.  Hemorrhage: No acute intracranial hemorrhage is seen.  CSF spaces: The ventricles sulci and basal cisterns are within normal limits.  Brain parenchyma: There is preservation of the grey white junction throughout.  Cerebellum:  Unremarkable.  Sella and skull base: The sella appears to be within normal limits.  Intracranial calcifications: Incidental note is made of bilateral choroid plexus calcification. Incidental note is made of some pineal region calcification.      Impression:  1. No hemodynamically significant stenosis, aneurysm or vascular malformation is seen. No acute intracranial process is seen. Details and findings as noted above.                          Preliminary result by Juan David Acosta Jr., MD (11/15/22 23:37:10)                   Narrative:    START OF REPORT:  Technique: CT angiogram of the intracranial vessels was performed with intravenous contrast with direct axial as well as sagittal and coronal reformations. CT angiogram of the neck vessels was performed with intravenous contrast with direct axial as well as sagittal and coronal reformations.    Comparison: Comparison is with study dated â2022-11-15 21:33:47â.    Clinical history: Left facial droop, weakness.    Findings:  Intracranial Vascular structures:  Internal carotid arteries: Mild atheromatous calcification of the cavernous clinoid segment of the bilateral internal carotid artery is seen with mild stenosis.  Middle cerebral arteries: Unremarkable.  Anterior cerebral arteries: Unremarkable.  Vertebral arteries: Left vertebral artery is dominant. The vertebrobasilar junction is unremarkable.  Basilar artery: Unremarkable.  Posterior cerebral arteries: Unremarkable.  Posterior communicating arteries: Unremarkable.  Neck Vascular structures: The visualized aorta and origin of the great vessels of the neck appear unremarkable. There is direct origin of the left vertebral artery from the aortic arch.  Carotids:  Common carotid arteries: Unremarkable.  Internal carotid artery: Left internal carotid artery is unremarkable. Subtle atheromatous calcification without significant stenosis at the origin of the right internal carotid artery is seen.  Vertebral  arteries: Left vertebral artery is dominant.  Jugular Veins and venous sinuses: Unremarkable.  Hemorrhage: No acute intracranial hemorrhage is seen.  CSF spaces: The ventricles sulci and basal cisterns are within normal limits.  Brain parenchyma: There is preservation of the grey white junction throughout.  Cerebellum: Unremarkable.  Sella and skull base: The sella appears to be within normal limits.  Intracranial calcifications: Incidental note is made of bilateral choroid plexus calcification. Incidental note is made of some pineal region calcification.      Impression:  1. No hemodynamically significant stenosis, aneurysm or vascular malformation is seen. No acute intracranial process is seen. Details and findings as noted above.                                         CT Head Without Contrast (Final result)  Result time 11/15/22 21:40:13      Final result by Vick Velasquez MD (11/15/22 21:40:13)                   Impression:      No acute intracranial findings.      Electronically signed by: Vick Velasquez  Date:    11/15/2022  Time:    21:40               Narrative:    EXAMINATION:  CT HEAD WITHOUT CONTRAST    CLINICAL HISTORY:  stroke suspected; left sided weakness;    TECHNIQUE:  CT imaging of the head performed from the skull base to the vertex without intravenous contrast. DLP 1018 mGycm. Automatic exposure control, adjustment of mA/kV or iterative reconstruction technique was used to reduce radiation.    COMPARISON:  None Available.    FINDINGS:  There is no acute cortical infarct, hemorrhage or mass lesion.  The ventricles are normal in size.  There are mild vascular calcifications.    Visualized paranasal sinuses and mastoid air cells are clear.                                       X-Ray Chest PA And Lateral (Final result)  Result time 11/15/22 17:42:47      Final result by John Sorenson MD (11/15/22 17:42:47)                   Impression:      No acute abnormality.      Electronically signed  by: John Sorenson  Date:    11/15/2022  Time:    17:42               Narrative:    EXAMINATION:  XR CHEST PA AND LATERAL    CLINICAL HISTORY:  chest pain;    TECHNIQUE:  PA and lateral views of the chest were performed.    COMPARISON:  None    FINDINGS:  The lungs are clear, with normal appearance of pulmonary vasculature and no pleural effusion or pneumothorax.    The cardiac silhouette is normal in size. The hilar and mediastinal contours are unremarkable.    Bones are intact.                                      EKG:    Results for orders placed or performed during the hospital encounter of 11/15/22   EKG 12-lead    Narrative    Test Reason : R07.9,    Vent. Rate : 079 BPM     Atrial Rate : 079 BPM     P-R Int : 162 ms          QRS Dur : 108 ms      QT Int : 408 ms       P-R-T Axes : 054 060 030 degrees     QTc Int : 467 ms    Normal sinus rhythm  Incomplete right bundle branch block  Borderline Abnormal ECG  No previous ECGs available  Confirmed by Roque Orellana MD ([id number]) on 11/17/2022 12:40:34 PM    Referred By:             Confirmed By:Roque Orellana MD       Telemetry:  NSR    Physical Exam  Vitals reviewed.   Constitutional:       Appearance: She is obese.   HENT:      Head: Normocephalic and atraumatic.   Eyes:      Conjunctiva/sclera: Conjunctivae normal.      Pupils: Pupils are equal, round, and reactive to light.   Cardiovascular:      Rate and Rhythm: Normal rate and regular rhythm.      Pulses: Normal pulses.      Heart sounds: Normal heart sounds.   Pulmonary:      Effort: Pulmonary effort is normal.      Breath sounds: Normal breath sounds.   Abdominal:      General: There is no distension.      Palpations: Abdomen is soft.   Musculoskeletal:         General: Normal range of motion.   Skin:     General: Skin is warm and dry.   Neurological:      Mental Status: She is alert.      Motor: Weakness present.      Comments: Left arm weakness       Home Medications:   No current facility-administered  medications on file prior to encounter.     Current Outpatient Medications on File Prior to Encounter   Medication Sig Dispense Refill    ALPRAZolam (XANAX) 1 MG tablet Take 1 mg by mouth.      atorvastatin (LIPITOR) 80 MG tablet Take 80 mg by mouth once daily.      cloNIDine (CATAPRES) 0.1 MG tablet Take 0.1 mg by mouth daily as needed.      desvenlafaxine succinate (PRISTIQ) 25 mg Tb24 Take 50 mg by mouth once daily.      ergocalciferol (VITAMIN D2) 50,000 unit Cap Take 50,000 Units by mouth once a week.      famotidine (PEPCID) 40 MG tablet Take 40 mg by mouth nightly.      furosemide (LASIX) 20 MG tablet Take 20 mg by mouth once daily.      levothyroxine 200 mcg Cap Take 1 capsule by mouth once daily.      lisinopriL (PRINIVIL,ZESTRIL) 5 MG tablet Take 5 mg by mouth once daily.      metFORMIN (GLUCOPHAGE) 1000 MG tablet Take 1,000 mg by mouth 2 (two) times a day.      metoprolol succinate 100 mg CSpX Take 100 mg by mouth once daily.      QUEtiapine (SEROQUEL) 100 MG Tab Take 100 mg by mouth once daily.      rOPINIRole (REQUIP) 1 MG tablet Take 1 mg by mouth nightly.      semaglutide (OZEMPIC SUBQ)   0 Refill(s), Start Date: 11/10/22 17:08:00 CST      clopidogreL (PLAVIX) 75 mg tablet Take 75 mg by mouth once daily.      insulin glargine (LANTUS) 100 unit/mL injection Inject 35 Units into the skin every evening.      insulin glargine (LANTUS) 100 unit/mL injection Inject 35 Units into the skin once daily.      omeprazole (PRILOSEC) 20 MG capsule Take 20 mg by mouth once daily.      QUEtiapine (SEROQUEL) 300 MG Tab Take 600 mg by mouth every evening.      rOPINIRole (REQUIP) 1 MG tablet Take 0.5 mg by mouth once daily.         Current Inpatient Medications:    Current Facility-Administered Medications:     0.9%  NaCl infusion, , Intravenous, Continuous, AMBIKA Stewart, Last Rate: 75 mL/hr at 11/18/22 0018, New Bag at 11/18/22 0018    acetaminophen tablet 650 mg, 650 mg, Oral, Q6H PRN, Raghu Simeon,  MD    ALPRAZolam tablet 1 mg, 1 mg, Oral, TID PRN, Gerardo Walker MD    aspirin EC tablet 81 mg, 81 mg, Oral, Daily, AMBIKA Stewart, 81 mg at 11/17/22 0808    atorvastatin tablet 80 mg, 80 mg, Oral, Daily, Gerardo Walker MD, 80 mg at 11/17/22 1111    clopidogreL tablet 75 mg, 75 mg, Oral, Daily, Gerardo Walker MD, 75 mg at 11/17/22 1111    desvenlafaxine succinate 24 hr tablet 50 mg, 50 mg, Oral, Daily, Gerardo Walker MD, 50 mg at 11/17/22 1145    enoxaparin injection 40 mg, 40 mg, Subcutaneous, Daily, Gerardo Walker MD, 40 mg at 11/17/22 1642    famotidine tablet 40 mg, 40 mg, Oral, Nightly, Gerardo Walker MD, 40 mg at 11/17/22 2019    furosemide tablet 20 mg, 20 mg, Oral, Daily, Gerardo Walker MD, 20 mg at 11/17/22 1111    glucagon (human recombinant) injection 1 mg, 1 mg, Intramuscular, PRN, ROSALINDA Suarez    HYDROcodone-acetaminophen 5-325 mg per tablet 1 tablet, 1 tablet, Oral, Q6H PRN, ROSALINDA Suarez, 1 tablet at 11/17/22 2021    insulin aspart U-100 injection 1-10 Units, 1-10 Units, Subcutaneous, Q6H PRN, ROSALINDA Suarez, 2 Units at 11/17/22 1321    labetaloL injection 10 mg, 10 mg, Intravenous, Q4H PRN, AMBIKA Stewart    levothyroxine tablet 200 mcg, 200 mcg, Oral, Daily, Gerardo Walker MD, 200 mcg at 11/17/22 1111    lisinopriL tablet 5 mg, 5 mg, Oral, Daily, Gerardo Walker MD, 5 mg at 11/17/22 1110    metFORMIN tablet 1,000 mg, 1,000 mg, Oral, BID, Gerardo Walker MD, 1,000 mg at 11/17/22 2019    metoprolol succinate (TOPROL-XL) 24 hr tablet 100 mg, 100 mg, Oral, Daily, Gerardo Walker MD, 100 mg at 11/17/22 1111    morphine injection 4 mg, 4 mg, Intravenous, Q4H PRN, Haley Britton, [facility], 4 mg at 11/18/22 0018    pantoprazole EC tablet 40 mg, 40 mg, Oral, Daily, Gerardo Walker MD, 40 mg at 11/17/22 1111    QUEtiapine tablet 100 mg, 100 mg, Oral, Daily, Gerardo Walker MD, 100 mg at 11/17/22  1110    QUEtiapine tablet 600 mg, 600 mg, Oral, QHS, Gerardo Walker MD, 600 mg at 11/17/22 2145    rOPINIRole tablet 0.5 mg, 0.5 mg, Oral, Daily, Gerardo Walker MD, 0.5 mg at 11/17/22 1110    rOPINIRole tablet 1 mg, 1 mg, Oral, Nightly, Gerardo Walker MD, 1 mg at 11/17/22 2021         VTE Risk Mitigation (From admission, onward)           Ordered     enoxaparin injection 40 mg  Daily         11/17/22 1449     IP VTE HIGH RISK PATIENT  Once         11/15/22 2207     Place sequential compression device  Until discontinued         11/15/22 2207                    Assessment:   NSVT  --One occasion of 7 beat run  --Currently NSR  Atrial arrthymia  --R-R complexes have same morphology as native  Bilateral carotid stenosis  --On Plavix, ASA  Suspected Acute CVA  History of CVA with residual left sided hemiplegia  Hypothyroid  --TSH 6.2  --on levothyroxine 200 mcg daily  HTN- controlled  Type II DM-uncontrolled  --[id number]--33.1  Morbid obesity  Tobacco use        Plan:   Continue Metoprolol 100 mg daily  Adjust thyroid medication per medicine team  Maximize CAD-GMDT  Continue Plavix and ASA as patient has carotid stenosis  Encourage smoking cessation  Encourage CPAP compliance    On discharge follow-up with [facility] in 1-2 weeks for outpatient LexiScan stress test, 2 week event monitor.         Thank you for your consult.     Vilma Hilton RN  Cardiology  [facility] - 9th Floor Med Surg  11/18/2022 8:04 AM     *Scribed in the presence of Dr. Dinesh Leiva      I have seen the patient, reviewed the Nurse Practitioner's consult note, assessment and plan. I have personally interviewed and examined the patient at bedside and agree with the findings.

## 2022-11-18 NOTE — PT/OT/SLP PROGRESS
"Physical Therapy  Treatment    Seema Ellis   MRN: [id number]   Admitting Diagnosis: <principal problem not specified>       PT Start Time: 0930     PT Stop Time: 0953    PT Total Time (min): 23 min       Billable Minutes:  Gait Training (15min) and Therapeutic Activity (8min)    Treatment Type: Treatment  PT/PTA: PT     PTA Visit Number: 1       General Precautions: Standard,    Orthopedic Precautions:     Braces: N/A  Respiratory Status: Room air         Subjective:  Communicated with nurse prior to session.  Pt reports L arm pain during the night she states is difficulty to describe. She denies any pain at time of session.    Pain/Comfort  Pain Rating 1: 0/10    Objective:   Patient found with: peripheral IV    Functional Mobility:  Bed Mobility:   Modified Ind using bed rails    Transfers:  SBA without AD    Gait:   SBA with RW x 300ft. Steady megan, no LOB. Mild SOB with exertion.    Stairs:  Steps ups with 4" box x 9 steps with HHA. Pt with good stability and proper sequencing. No LOB. Mild fatigue with exertion.    Balance:   Static Sit: NORMAL: No deviations seen in posture held statically  Dynamic Sit: NORMAL: No deviations seen in posture held dynamically  Static Stand: NORMAL: No deviations seen in posture held statically  Dynamic stand: GOOD: Needs SUPERVISION only during gait and able to self right with moderate LOB       Treatment and Education:  Pt completed bed mobility mod I with use of bed rails. Transfers completed SBA without AD to help manage medical lines. Gait x 300ft with RW, SBA without rest break. Pt maintained steady megan without LOB, able to perform head turns and changes in directions without LOB or deviation from path. Stair training using 4" box x 9 step ups with HHA on R. Pt completed slow and steady with no instability but mild SOB. She reports this occurred at baseline.      AM-PAC 6 CLICK MOBILITY  How much help from another person does this patient currently need?   1 = " Unable, Total/Dependent Assistance  2 = A lot, Maximum/Moderate Assistance  3 = A little, Minimum/Contact Guard/Supervision  4 = None, Modified [place]/Independent    Turning over in bed (including adjusting bedclothes, sheets and blankets)?: 4  Sitting down on and standing up from a chair with arms (e.g., wheelchair, bedside commode, etc.): 4  Moving from lying on back to sitting on the side of the bed?: 4  Moving to and from a bed to a chair (including a wheelchair)?: 4  Need to walk in hospital room?: 3  Climbing 3-5 steps with a railing?: 3  Basic Mobility Total Score: 22    AM-PAC Raw Score CMS G-Code Modifier Level of Impairment Assistance   6 % Total / Unable   7 - 9 CM 80 - 100% Maximal Assist   10 - 14 CL 60 - 80% Moderate Assist   15 - 19 CK 40 - 60% Moderate Assist   20 - 22 CJ 20 - 40% Minimal Assist   23 CI 1-20% SBA / CGA   24 CH 0% Independent/ Mod I     Patient left up in chair with all lines intact and call button in reach.    Assessment:  Seema Ellis is a 52 y.o. female with a medical diagnosis of <principal problem not specified> and presents with Pt demonstrates good activity tolerance. She is able to mobilize with overall SBA using RW. She states the SOB upon exertion is normal at baseline and was being previously addressed by home health PT. Pt is safe to discharge home with RW and recommend continuing with home health services.    Rehab identified problem list/impairments: Rehab identified problem list/impairments: weakness, impaired functional mobility, gait instability, impaired balance, decreased lower extremity function    Rehab potential is good.    Activity tolerance: Good    Discharge recommendations: Discharge Facility/Level of Care Needs: home, home health PT     Barriers to discharge:      Equipment recommendations: Equipment Needed After Discharge: walker, rolling     GOALS:   Multidisciplinary Problems       Physical Therapy Goals          Problem: Physical  Therapy    Goal Priority Disciplines Outcome Goal Variances Interventions   Physical Therapy Goal     PT, PT/OT Ongoing, Progressing     Description: Goals to be met by: 2022     Patient will increase functional independence with mobility by performin. Sit to stand transfer with Modified [place]  2. Bed to chair transfer with Modified [place] using Rolling Walker  3. Gait  x 500 feet with Modified [place] using Rolling Walker.   4. Ascend/Descend 9 steps without railing, SBA                         PLAN:    Patient to be seen 6 x/week  to address the above listed problems via gait training, therapeutic activities, therapeutic exercises, neuromuscular re-education  Plan of Care expires: 22  Plan of Care reviewed with: patient         2022

## 2022-11-18 NOTE — PLAN OF CARE
11/18/22 1320   Final Note   Assessment Type Final Discharge Note   Anticipated Discharge Disposition Home-Health   Hospital Resources/Appts/Education Provided Post-Acute resouces added to AVS   Post-Acute Status   Post-Acute Authorization Home Health;HME   HME Status Set-up Complete/Auth obtained  (Walker via [place])   Home Health Status Set-up Complete/Auth obtained  (Resume with Caren MIGUEL)

## 2022-11-19 NOTE — NURSING
Pt discharged left via medicare transport in a stable condition educated on meds and other protocol instructions and pt verbalize understanding

## 2023-01-16 PROBLEM — I63.9 ACUTE ISCHEMIC STROKE: Status: RESOLVED | Noted: 2022-10-13 | Resolved: 2023-01-16

## 2023-04-27 ENCOUNTER — HOSPITAL ENCOUNTER (OUTPATIENT)
Dept: TELEMEDICINE | Facility: HOSPITAL | Age: 53
Discharge: HOME | End: 2023-04-27
Payer: MEDICAID

## 2023-04-27 DIAGNOSIS — I63.9 CEREBROVASCULAR ACCIDENT (CVA), UNSPECIFIED MECHANISM: ICD-10-CM

## 2023-04-27 PROCEDURE — 99215 PR OFFICE/OUTPT VISIT, EST, LEVL V, 40-54 MIN: ICD-10-PCS | Mod: 95,,, | Performed by: PSYCHIATRY & NEUROLOGY

## 2023-04-27 PROCEDURE — 99215 OFFICE O/P EST HI 40 MIN: CPT | Mod: 95,,, | Performed by: PSYCHIATRY & NEUROLOGY

## 2023-04-28 NOTE — ASSESSMENT & PLAN NOTE
Right sided weakness, facial droop and slurred speech.   Antithrombotics for secondary stroke prevention: Antiplatelets: Aspirin: 81 mg daily  Clopidogrel: 75 mg daily    Statins for secondary stroke prevention and hyperlipidemia, if present:   Statins: Atorvastatin- 80 mg daily    Aggressive risk factor modification: HTN, Smoking, DM, HLD, Obesity     Rehab efforts: The patient has been evaluated by a stroke team provider and the therapy needs have been fully considered based off the presenting complaints and exam findings. The following therapy evaluations are needed: PT evaluate and treat, OT evaluate and treat, SLP evaluate and treat    Diagnostics ordered/pending: CTA Head to assess vasculature , CTA Neck/Arch to assess vasculature, HgbA1C to assess blood glucose levels, Lipid Profile to assess cholesterol levels, MRI head without contrast to assess brain parenchyma, TTE to assess cardiac function/status , TSH to assess thyroid function    VTE prophylaxis: Enoxaparin 40 mg SQ every 24 hours    BP parameters: Infarct: No intervention, SBP <220

## 2023-04-28 NOTE — HPI
Sudden onset of right sided weakness and slurred speech. Associated with confusion.   LKN 1030am  Previous history of stroke with left sided residual deficits.     PMHx  Stroke  HTN  DM  Thyroid disease  Hypercholesterolemia  COPD

## 2023-04-28 NOTE — SUBJECTIVE & OBJECTIVE
Woke up with symptoms?: no    Recent bleeding noted: no  Does the patient take any Blood Thinners? yes  Medications: Antiplatelets:  clopidogrel/Plavix      Past Medical History: hypertension, diabetes and hyperlipidemia    Past Surgical History: no relevant surgical history    Family History: no relevant history    Social History: smoker (active)    Allergies: Penicillins  Toradol [Ketorolac]  Tramadol     Review of Systems   Neurological: Positive for facial asymmetry, speech difficulty and weakness.   All other systems reviewed and are negative.    Objective:   Vitals:  BP: 168/93, Respiratory Rate: 18 and Heart Rate: 84    CT READ: Imaging not available on PACS. ED provider to document report in chart     Physical Exam  Vitals reviewed.   Constitutional:       Appearance: Normal appearance. She is obese.   HENT:      Head: Normocephalic and atraumatic.   Eyes:      Extraocular Movements: Extraocular movements intact.      Pupils: Pupils are equal, round, and reactive to light.   Pulmonary:      Effort: Pulmonary effort is normal.   Neurological:      Mental Status: She is alert and oriented to person, place, and time.      Cranial Nerves: Cranial nerve deficit present.      Sensory: Sensory deficit present.      Motor: Weakness present.

## 2023-04-28 NOTE — CONSULTS
[facility]  Vascular Neurology  Comprehensive Stroke Center  TeleVascular Neurology Acute Consultation Note      Consults    Consulting Provider: NEERAJ BAJWA  Current Providers  No providers found    Patient Location: [facility]  Spoke hospital nurse at bedside with patient assisting consultant.     Patient information was obtained from patient.         Assessment/Plan:       Diagnoses:   Neuro  Stroke  Right sided weakness, facial droop and slurred speech.   Antithrombotics for secondary stroke prevention: Antiplatelets: Aspirin: 81 mg daily  Clopidogrel: 75 mg daily    Statins for secondary stroke prevention and hyperlipidemia, if present:   Statins: Atorvastatin- 80 mg daily    Aggressive risk factor modification: HTN, Smoking, DM, HLD, Obesity     Rehab efforts: The patient has been evaluated by a stroke team provider and the therapy needs have been fully considered based off the presenting complaints and exam findings. The following therapy evaluations are needed: PT evaluate and treat, OT evaluate and treat, SLP evaluate and treat    Diagnostics ordered/pending: CTA Head to assess vasculature , CTA Neck/Arch to assess vasculature, HgbA1C to assess blood glucose levels, Lipid Profile to assess cholesterol levels, MRI head without contrast to assess brain parenchyma, TTE to assess cardiac function/status , TSH to assess thyroid function    VTE prophylaxis: Enoxaparin 40 mg SQ every 24 hours    BP parameters: Infarct: No intervention, SBP <220            STROKE DOCUMENTATION     Acute Stroke Times:   Acute Stroke Times   Stroke Team Arrival Date: 04/27/23  Stroke Team Arrival Time: 2225    NIH Scale:  Interval: baseline  1a. Level of Consciousness: 0-->Alert, keenly responsive  1b. LOC Questions: 0-->Answers both questions correctly  1c. LOC Commands: 0-->Performs both tasks correctly  2. Best Gaze:  0-->Normal  3. Visual: 0-->No visual loss  4. Facial Palsy: 2-->Partial paralysis (total or near-total paralysis of lower face)  5a. Motor Arm, Left: 1-->Drift, limb holds 90 (or 45) degrees, but drifts down before full 10 seconds, does not hit bed or other support  5b. Motor Arm, Right: 0-->No drift, limb holds 90 (or 45) degrees for full 10 secs  6a. Motor Leg, Left: 1-->Drift, leg falls by the end of the 5-sec period but does not hit bed  6b. Motor Leg, Right: 0-->No drift, leg holds 30 degree position for full 5 secs  7. Limb Ataxia: 0-->Absent  8. Sensory: 1-->Mild-to-moderate sensory loss, patient feels pinprick is less sharp or is dull on the affected side, or there is a loss of superficial pain with pinprick, but patient is aware of being touched  9. Best Language: 0-->No aphasia, normal  10. Dysarthria: 1-->Mild-to-moderate dysarthria, patient slurs at least some words and, at worst, can be understood with some difficulty  11. Extinction and Inattention (formerly Neglect): 0-->No abnormality  Total (NIH Stroke Scale): 6     Modified John Score: 2  Irving Coma Scale:15   ABCD2 Score:    [id number] Score:   HAS -BLED Score:   ICH Score:   Hunt & Almaraz Classification:       There were no vitals taken for this visit.  Eligible for thrombolytic therapy?: No  Thrombolytic therapy recomended: Thrombolytic therapy not recommended due to Outside of treatment window   Possible Interventional Revascularization Candidate? No; at this time symptoms not suggestive of large vessel occlusion    Disposition Recommendation: admit to inpatient    Subjective:     History of Present Illness:  Sudden onset of right sided weakness and slurred speech. Associated with confusion.   LKN 1030am  Previous history of stroke with left sided residual deficits.     PMHx  Stroke  HTN  DM  Thyroid disease  Hypercholesterolemia  COPD      Woke up with symptoms?: no    Recent bleeding noted: no  Does the patient take any Blood Thinners?  yes  Medications: Antiplatelets:  clopidogrel/Plavix      Past Medical History: hypertension, diabetes and hyperlipidemia    Past Surgical History: no relevant surgical history    Family History: no relevant history    Social History: smoker (active)    Allergies: Penicillins  Toradol [Ketorolac]  Tramadol     Review of Systems   Neurological: Positive for facial asymmetry, speech difficulty and weakness.   All other systems reviewed and are negative.    Objective:   Vitals:  BP: 168/93, Respiratory Rate: 18 and Heart Rate: 84    CT READ: Imaging not available on PACS. ED provider to document report in chart     Physical Exam  Vitals reviewed.   Constitutional:       Appearance: Normal appearance. She is obese.   HENT:      Head: Normocephalic and atraumatic.   Eyes:      Extraocular Movements: Extraocular movements intact.      Pupils: Pupils are equal, round, and reactive to light.   Pulmonary:      Effort: Pulmonary effort is normal.   Neurological:      Mental Status: She is alert and oriented to person, place, and time.      Cranial Nerves: Cranial nerve deficit present.      Sensory: Sensory deficit present.      Motor: Weakness present.             Recommended the emergency room physician to have a brief discussion with the patient and/or family if available regarding the  risks and benefits of treatment, and to briefly document the occurrence of that discussion in his clinical encounter note.     The attending portion of this evaluation, treatment, and documentation was performed per Prashanth Love MD via audiovisual.    Billing code:  ([facility] INPT CONSULT 35MIN)    In your opinion, this was a: Tier 2 Van Negative    Consult End Time: 10:39 PM     Prashanth Love MD  [facility]  Vascular Neurology   [facility]

## 2023-05-08 ENCOUNTER — HOSPITAL ENCOUNTER (OUTPATIENT)
Dept: TELEMEDICINE | Facility: HOSPITAL | Age: 53
Discharge: HOME | End: 2023-05-08
Payer: MEDICAID

## 2023-05-08 DIAGNOSIS — R53.1 RIGHT SIDED WEAKNESS: ICD-10-CM

## 2023-05-08 PROCEDURE — 99213 PR OFFICE/OUTPT VISIT, EST, LEVL III, 20-29 MIN: ICD-10-PCS | Mod: 95,,, | Performed by: STUDENT IN AN ORGANIZED HEALTH CARE EDUCATION/TRAINING PROGRAM

## 2023-05-08 PROCEDURE — 99213 OFFICE O/P EST LOW 20 MIN: CPT | Mod: 95,,, | Performed by: STUDENT IN AN ORGANIZED HEALTH CARE EDUCATION/TRAINING PROGRAM

## 2023-05-09 NOTE — SUBJECTIVE & OBJECTIVE
Woke up with symptoms?: Yes    Recent bleeding noted: no  Does the patient take any Blood Thinners? no  Medications: Antiplatelets:  clopidogrel/Plavix      Past Medical History: hypertension and diabetes    Past Surgical History: no relevant surgical history    Family History: no relevant history    Social History: smoker (active)    Allergies: Penicillins  Toradol [Ketorolac]  Tramadol No relevant allergies    Review of Systems   HENT: Negative for trouble swallowing.    Eyes: Negative for visual disturbance.   Musculoskeletal: Positive for gait problem.   Neurological: Positive for speech difficulty, weakness and numbness. Negative for headaches.     Objective:   Vitals: There were no vitals taken for this visit. BP: 148/71    CT READ: Yes  No hemmorhage. No mass effect. No early infarct signs.     Physical Exam  Neurological:      Cranial Nerves: No dysarthria or facial asymmetry.      Sensory: No sensory deficit.      Motor: No pronator drift.      Coordination: Finger-Nose-Finger Test normal.      Comments: Right UE and LE drift - appears functional.

## 2023-05-09 NOTE — ASSESSMENT & PLAN NOTE
52 y/o female with a history of HTN, DM, prior stroke (s/p tPA x3 in July 2019, July 2022 and October 2022), tobacco use who presents with right sided weakness, numbness and slurred speech on waking up today at 5-5:30pm. Went to bed at 4-4:30pm.    NIHSS 6 - the weakness/drift appear functional.    CT head showed no acute changes    Recommendations:  -- Not a candidate for IV thrombolytics as she is OOW. Moreover, her current exam appears to be effort dependent. Strong likelihood that her current presentation is functional.   f note, she has had 3 prior presentations of LSW s/p tPA with no evidence of prior infarcts noted on a subsequent MRI.   -- Can obtain MRI brain and if normal, can provide reassurance and obtain a Psych consult.  -- On the off chance her MRI shows an acute ischemic stroke, please consult neurology.

## 2023-05-09 NOTE — CONSULTS
[facility]  Vascular Neurology  Comprehensive Stroke Center  TeleVascular Neurology Acute Consultation Note      Consults    Consulting Provider: GA GARCIA  Current Providers  No providers found    Patient Location: [facility]  Spoke hospital nurse at bedside with patient assisting consultant.     Patient information was obtained from patient, past medical records, and ER records.         Assessment/Plan:       Diagnoses:   Other  Right sided weakness  54 y/o female with a history of HTN, DM, prior stroke (s/p tPA x3 in July 2019, July 2022 and October 2022), tobacco use who presents with right sided weakness, numbness and slurred speech on waking up today at 5-5:30pm. Went to bed at 4-4:30pm.    NIHSS 6 - the weakness/drift appear functional.    CT head showed no acute changes  She reports a history of left carotid narrowing - CTA from Nov 2022 showed no evidence of atherosclerotic changes or occlusion.    Recommendations:  -- Not a candidate for IV thrombolytics as she is OOW. Moreover, her current exam appears to be effort dependent. Strong likelihood that her current presentation is functional.   Of note, she has had 3 prior presentations of LSW s/p tPA with no evidence of prior infarcts noted on a subsequent MRI.   -- Can obtain MRI brain and if normal, can provide reassurance and obtain a Psych consult.  -- On the off chance her MRI shows an acute ischemic stroke, please consult neurology.          STROKE DOCUMENTATION     Acute Stroke Times:   Acute Stroke Times   Last Known Normal Date: 05/08/23  Last Known Normal Time: 1600 (4-4:30pm)  Symptom Onset Date: 05/08/23  Symptom Onset Time:  (5-5:30pm)  Stroke Team Called Date: 05/08/23  Stroke Team Called Time: 2029  Stroke Team Arrival Date: 05/08/23  Stroke Team Arrival Time: 2032  CT Interpretation Time: 2048  Thrombolytic Therapy Recommended:  No    NIH Scale:  1a. Level of Consciousness: 0-->Alert, keenly responsive  1b. LOC Questions: 0-->Answers both questions correctly  1c. LOC Commands: 0-->Performs both tasks correctly  2. Best Gaze: 0-->Normal  3. Visual: 0-->No visual loss  4. Facial Palsy: 0-->Normal symmetrical movements  5a. Motor Arm, Left: 0-->No drift, limb holds 90 (or 45) degrees for full 10 secs  5b. Motor Arm, Right: 2-->Some effort against gravity, limb cannot get to or maintain (if cued) 90 (or 45) degrees, drifts down to bed, but has some effort against gravity  6a. Motor Leg, Left: 0-->No drift, leg holds 30 degree position for full 5 secs  6b. Motor Leg, Right: 2-->Some effort against gravity, leg falls to bed by 5 secs, but has some effort against gravity  7. Limb Ataxia: 0-->Absent  8. Sensory: 1-->Mild-to-moderate sensory loss, patient feels pinprick is less sharp or is dull on the affected side, or there is a loss of superficial pain with pinprick, but patient is aware of being touched  9. Best Language: 0-->No aphasia, normal  10. Dysarthria: 1-->Mild-to-moderate dysarthria, patient slurs at least some words and, at worst, can be understood with some difficulty  11. Extinction and Inattention (formerly Neglect): 0-->No abnormality  Total (NIH Stroke Scale): 6     Modified Kilmichael Score: 2  Sneha Coma Scale:    ABCD2 Score:    [id number] Score:   HAS -BLED Score:   ICH Score:   Hunt & Almaraz Classification:       There were no vitals taken for this visit.  Eligible for thrombolytic therapy?: No  Thrombolytic therapy recomended: Thrombolytic therapy not recommended due to Outside of treatment window  and Suspected stroke mimic   Possible Interventional Revascularization Candidate? No; at this time symptoms not suggestive of large vessel occlusion    Disposition Recommendation: pending further studies    Subjective:     History of Present Illness:  54 y/o female with a history of HTN, DM, prior stroke (s/p tPA x3 in July 2019,  July 2022 and October 2022), tobacco use who presents with new onset right sided weakness, numbness and slurred speech upon waking up from a nap today at 5-5:30pm. Went to bed at 4-4:30pm. She also complains of slurred speech and feels 'her tongue is moving around'.   Has had 3 prior presentations of left sided weakness for which she has received tPA, most recently in Oct 2022. She report baseline LSW from her prior strokes. MRI brain from Nov 2022 showed no evidence of prior infarcts.    /71       Uses a walker to ambulate.      Woke up with symptoms?: Yes    Recent bleeding noted: no  Does the patient take any Blood Thinners? no  Medications: Antiplatelets:  clopidogrel/Plavix      Past Medical History: hypertension and diabetes    Past Surgical History: no relevant surgical history    Family History: no relevant history    Social History: smoker (active)    Allergies: Penicillins  Toradol [Ketorolac]  Tramadol No relevant allergies    Review of Systems   HENT: Negative for trouble swallowing.    Eyes: Negative for visual disturbance.   Musculoskeletal: Positive for gait problem.   Neurological: Positive for speech difficulty, weakness and numbness. Negative for headaches.     Objective:   Vitals: There were no vitals taken for this visit. BP: 148/71    CT READ: Yes  No hemmorhage. No mass effect. No early infarct signs.     Physical Exam  Neurological:      Cranial Nerves: No dysarthria or facial asymmetry.      Sensory: No sensory deficit.      Motor: No pronator drift.      Coordination: Finger-Nose-Finger Test normal.      Comments: Right UE and LE drift - appears functional.             Recommended the emergency room physician to have a brief discussion with the patient and/or family if available regarding the  risks and benefits of treatment, and to briefly document the occurrence of that discussion in his clinical encounter note.     The attending portion of this evaluation, treatment, and  documentation was performed per Je Muñiz MD via audiovisual.    Billing code:  (non-stroke, some mimics)    This patient has neurological symptom(s)/condition/illness, with minimal potential for morbidity and mortality.  There is a low probability for acute neurological change leading to clinical and possibly life-threatening deterioration requiring highest level of physician preparedness for urgent intervention.  Care was coordinated with other physicians involved in the patient's care.  Radiologic studies and laboratory data were reviewed and interpreted, and plan of care was re-assessed based on the results.  Diagnosis, treatment options and prognosis may have been discussed with the patient and/or family members or caregiver.    In your opinion, this was a: Tier 2 Van Negative    Consult End Time: 9:01 PM     Je Muñiz MD  [facility]  Vascular Neurology   [facility]

## 2023-05-09 NOTE — HPI
52 y/o female with a history of HTN, DM, prior stroke (s/p tPA x3 in July 2019, July 2022 and October 2022), tobacco use who presents with new onset right sided weakness, numbness and slurred speech upon waking up from a nap today at 5-5:30pm. Went to bed at 4-4:30pm. She also complains of slurred speech and feels 'her tongue is moving around'.   Has had 3 prior presentations of left sided weakness for which she has received tPA, most recently in Oct 2022. She report baseline LSW from her prior strokes. MRI brain from Nov 2022 showed no evidence of prior infarcts.    /71       Uses a walker to ambulate.

## 2023-07-31 PROBLEM — I63.9 STROKE: Status: RESOLVED | Noted: 2022-10-13 | Resolved: 2023-07-31
